# Patient Record
Sex: FEMALE | Race: BLACK OR AFRICAN AMERICAN | Employment: UNEMPLOYED | URBAN - METROPOLITAN AREA
[De-identification: names, ages, dates, MRNs, and addresses within clinical notes are randomized per-mention and may not be internally consistent; named-entity substitution may affect disease eponyms.]

---

## 2017-05-30 ENCOUNTER — ROUTINE PRENATAL (OUTPATIENT)
Dept: OBGYN CLINIC | Age: 32
End: 2017-05-30

## 2017-05-30 ENCOUNTER — HOSPITAL ENCOUNTER (OUTPATIENT)
Dept: LAB | Age: 32
Discharge: HOME | End: 2017-05-30
Payer: OTHER GOVERNMENT

## 2017-05-30 VITALS
HEART RATE: 93 BPM | DIASTOLIC BLOOD PRESSURE: 71 MMHG | WEIGHT: 250 LBS | SYSTOLIC BLOOD PRESSURE: 126 MMHG | BODY MASS INDEX: 40.18 KG/M2 | HEIGHT: 66 IN

## 2017-05-30 DIAGNOSIS — N91.2 AMENORRHEA: Primary | ICD-10-CM

## 2017-05-30 DIAGNOSIS — E66.01 MORBID OBESITY DUE TO EXCESS CALORIES (HCC): ICD-10-CM

## 2017-05-30 DIAGNOSIS — Z3A.01 7 WEEKS GESTATION OF PREGNANCY: ICD-10-CM

## 2017-05-30 LAB — GLUCOSE 1H P 100 G GLC PO SERPL-MCNC: 121 MG/DL

## 2017-05-30 PROCEDURE — 36415 COLL VENOUS BLD VENIPUNCTURE: CPT | Performed by: OBSTETRICS & GYNECOLOGY

## 2017-05-30 PROCEDURE — 82950 GLUCOSE TEST: CPT | Performed by: OBSTETRICS & GYNECOLOGY

## 2017-05-30 NOTE — MR AVS SNAPSHOT
Visit Information Date & Time Provider Department Dept. Phone Encounter #  
 5/30/2017  2:15 PM Valerie Valdez, [address] [facility] [phone] [id number] Upcoming Health Maintenance Date Due INFLUENZA AGE 9 TO ADULT 8/1/2017 PAP AKA CERVICAL CYTOLOGY 9/15/2018 Allergies as of 5/30/2017  Review Complete On: 5/30/2017 By: Valerie Valdez, DO No Known Allergies Current Immunizations  Reviewed on 8/15/2016 No immunizations on file. Not reviewed this visit You Were Diagnosed With   
  
 Codes Comments Amenorrhea    -  Primary ICD-10-CM: N91.2 ICD-9-CM: 626.0   
 7 weeks gestation of pregnancy     ICD-10-CM: Z3A.01 
ICD-9-CM: V22.2 Vitals BP Pulse Height(growth percentile) Weight(growth percentile) LMP BMI  
 126/71 93 5' 6\" (1.676 m) 250 lb (113.4 kg) 04/06/2017 40.35 kg/m2 OB Status Smoking Status Pregnant Never Smoker Vitals History BMI and BSA Data Body Mass Index Body Surface Area  
 40.35 kg/m 2 2.3 m 2 Preferred Pharmacy Pharmacy Name Phone [facility] [address] [facility], [address] [place] [address] [phone] Your Updated Medication List  
  
   
This list is accurate as of: 5/30/17  3:12 PM.  Always use your most recent med list.  
  
  
  
  
 Blood-Glucose Meter monitoring kit Check fasting sugars twice a day before breakfast and dinner. One touch meter  
  
 glucose blood VI test strips strip Commonly known as:  ASCENSIA AUTODISC VI, ONE TOUCH ULTRA TEST VI Check fasting sugars twice a day before breakfast and dinner. One touch meter Lancets Misc Check fasting sugars twice a day before breakfast and dinner. One touch meter  
  
 multivitamin tablet Commonly known as:  ONE A DAY Take  by mouth. Take one po occasionally Patient Instructions Weeks 6 to 10 of Your Pregnancy: Care Instructions Your Care Instructions Congratulations on your pregnancy. This is an exciting and important time for you. During the first 6 to 10 weeks of your pregnancy, your body goes through many changes. Your baby grows very fast, even though you cannot feel it yet. You may start to notice that you feel different, both in your body and your emotions. Because each woman's pregnancy is unique, there is no right way to feel. You may feel the healthiest you have ever been, or you may feel tired or sick to your stomach (\"morning sickness\"). These early weeks are a time to make healthy choices and to eat the best foods for you and your baby. This care sheet will give you some ideas. This is also a good time to think about birth defects testing. These are tests done during pregnancy to look for possible problems with the baby. First trimester tests for birth defects can be done between 8 and 17 weeks of pregnancy, depending on the test. Talk with your doctor about what kinds of tests are available. Follow-up care is a key part of your treatment and safety. Be sure to make and go to all appointments, and call your doctor if you are having problems. It's also a good idea to know your test results and keep a list of the medicines you take. How can you care for yourself at home? Eat well · Eat at least 3 meals and 2 healthy snacks every day. Eat fresh, whole foods, including: ¨ 7 or more servings of bread, tortillas, cereal, rice, pasta, or oatmeal. 
¨ 3 or more servings of vegetables, especially leafy green vegetables. ¨ 2 or more servings of fruits. ¨ 3 or more servings of milk, yogurt, or cheese. ¨ 2 or more servings of meat, turkey, chicken, fish, eggs, or dried beans. · Drink plenty of fluids, especially water. Avoid sodas and other sweetened drinks. · Choose foods that have important vitamins for your baby, such as calcium, iron, and folate.  
¨ Dairy products, tofu, canned fish with bones, almonds, broccoli, dark leafy greens, corn tortillas, and fortified orange juice are good sources of calcium. ¨ Beef, poultry, liver, spinach, lentils, dried beans, fortified cereals, and dried fruits are rich in iron. ¨ Dark leafy greens, broccoli, asparagus, liver, fortified cereals, orange juice, peanuts, and almonds are good sources of folate. · Avoid foods that could harm your baby. ¨ Do not eat raw or undercooked meat, chicken, or fish (such as sushi or raw oysters). ¨ Do not eat raw eggs or foods that contain raw eggs, such as Caesar dressing. ¨ Do not eat soft cheeses and unpasteurized dairy foods, such as Brie, feta, or blue cheese. ¨ Do not eat fish that contains a lot of mercury, such as shark, swordfish, tilefish, or nhi mackerel. Do not eat more than 6 ounces of tuna each week. ¨ Do not eat raw sprouts, especially alfalfa sprouts. ¨ Cut down on caffeine, such as coffee, tea, and cola. Protect yourself and your baby · Do not touch ha litter or cat feces. They can cause an infection that could harm your baby. · High body temperature can be harmful to your baby. So if you want to use a sauna or hot tub, be sure to talk to your doctor about how to use it safely. [place] with morning sickness · Sip small amounts of water, juices, or shakes. Try drinking between meals, not with meals. · Eat 5 or 6 small meals a day. Try dry toast or crackers when you first get up, and eat breakfast a little later. · Avoid spicy, greasy, and fatty foods. · When you feel sick, open your windows or go for a short walk to get fresh air. · Try nausea wristbands. These help some women. · Tell your doctor if you think your prenatal vitamins make you sick. Where can you learn more? Go to http://jesica.info/. Enter G112 in the search box to learn more about \"Weeks 6 to 10 of Your Pregnancy: Care Instructions. \" Current as of: May 30, 2016 Content Version: 11.2 © 5151-5551 Healthwise, Incorporated. Care instructions adapted under license by [employer] (which disclaims liability or warranty for this information). If you have questions about a medical condition or this instruction, always ask your healthcare professional. [address] any warranty or liability for your use of this information. Introducing [facility] & HEALTH SERVICES! [employer] introduces Jaunt patient portal. Now you can access parts of your medical record, email your doctor's office, and request medication refills online. 1. In your internet browser, go to [email or web address] 2. Click on the First Time User? Click Here link in the Sign In box. You will see the New Member Sign Up page. 3. Enter your Jaunt Access Code exactly as it appears below. You will not need to use this code after youve completed the sign-up process. If you do not sign up before the expiration date, you must request a new code. · Jaunt Access Code: [email or web address] Expires: 8/28/2017  3:12 PM 
 
4. Enter the last four digits of your Social Security Number (xxxx) and Date of Birth (mm/dd/yyyy) as indicated and click Submit. You will be taken to the next sign-up page. 5. Create a Jaunt ID. This will be your Jaunt login ID and cannot be changed, so think of one that is secure and easy to remember. 6. Create a Jaunt password. You can change your password at any time. 7. Enter your Password Reset Question and Answer. This can be used at a later time if you forget your password. 8. Enter your e-mail address. You will receive e-mail notification when new information is available in [address] 9. Click Sign Up. You can now view and download portions of your medical record. 10. Click the Download Summary menu link to download a portable copy of your medical information.  
 
If you have questions, please visit the Frequently Asked Questions section of the RegeneMed. Remember, [employer] is NOT to be used for urgent needs. For medical emergencies, dial 911. Now available from your iPhone and Android! Please provide this summary of care documentation to your next provider. Your primary care clinician is listed as Mahendra Watt. If you have any questions after today's visit, please call [phone].

## 2017-05-30 NOTE — PATIENT INSTRUCTIONS
Weeks 6 to 10 of Your Pregnancy: Care Instructions  Your Care Instructions    Congratulations on your pregnancy. This is an exciting and important time for you. During the first 6 to 10 weeks of your pregnancy, your body goes through many changes. Your baby grows very fast, even though you cannot feel it yet. You may start to notice that you feel different, both in your body and your emotions. Because each woman's pregnancy is unique, there is no right way to feel. You may feel the healthiest you have ever been, or you may feel tired or sick to your stomach (\"morning sickness\"). These early weeks are a time to make healthy choices and to eat the best foods for you and your baby. This care sheet will give you some ideas. This is also a good time to think about birth defects testing. These are tests done during pregnancy to look for possible problems with the baby. First trimester tests for birth defects can be done between 8 and 17 weeks of pregnancy, depending on the test. Talk with your doctor about what kinds of tests are available. Follow-up care is a key part of your treatment and safety. Be sure to make and go to all appointments, and call your doctor if you are having problems. It's also a good idea to know your test results and keep a list of the medicines you take. How can you care for yourself at home? Eat well  · Eat at least 3 meals and 2 healthy snacks every day. Eat fresh, whole foods, including:  ¨ 7 or more servings of bread, tortillas, cereal, rice, pasta, or oatmeal.  ¨ 3 or more servings of vegetables, especially leafy green vegetables. ¨ 2 or more servings of fruits. ¨ 3 or more servings of milk, yogurt, or cheese. ¨ 2 or more servings of meat, turkey, chicken, fish, eggs, or dried beans. · Drink plenty of fluids, especially water. Avoid sodas and other sweetened drinks. · Choose foods that have important vitamins for your baby, such as calcium, iron, and folate.   ¨ Dairy products, tofu, canned fish with bones, almonds, broccoli, dark leafy greens, corn tortillas, and fortified orange juice are good sources of calcium. ¨ Beef, poultry, liver, spinach, lentils, dried beans, fortified cereals, and dried fruits are rich in iron. ¨ Dark leafy greens, broccoli, asparagus, liver, fortified cereals, orange juice, peanuts, and almonds are good sources of folate. · Avoid foods that could harm your baby. ¨ Do not eat raw or undercooked meat, chicken, or fish (such as sushi or raw oysters). ¨ Do not eat raw eggs or foods that contain raw eggs, such as Caesar dressing. ¨ Do not eat soft cheeses and unpasteurized dairy foods, such as Brie, feta, or blue cheese. ¨ Do not eat fish that contains a lot of mercury, such as shark, swordfish, tilefish, or nhi mackerel. Do not eat more than 6 ounces of tuna each week. ¨ Do not eat raw sprouts, especially alfalfa sprouts. ¨ Cut down on caffeine, such as coffee, tea, and cola. Protect yourself and your baby  · Do not touch ha litter or cat feces. They can cause an infection that could harm your baby. · High body temperature can be harmful to your baby. So if you want to use a sauna or hot tub, be sure to talk to your doctor about how to use it safely. [place] with morning sickness  · Sip small amounts of water, juices, or shakes. Try drinking between meals, not with meals. · Eat 5 or 6 small meals a day. Try dry toast or crackers when you first get up, and eat breakfast a little later. · Avoid spicy, greasy, and fatty foods. · When you feel sick, open your windows or go for a short walk to get fresh air. · Try nausea wristbands. These help some women. · Tell your doctor if you think your prenatal vitamins make you sick. Where can you learn more? Go to http://jesica.info/. Enter G112 in the search box to learn more about \"Weeks 6 to 10 of Your Pregnancy: Care Instructions. \"  Current as of:  May 30, 2016  Content Version: 11.2  © 0755-7667 Free-lance[employer], Incorporated. Care instructions adapted under license by [employer] (which disclaims liability or warranty for this information). If you have questions about a medical condition or this instruction, always ask your healthcare professional. [address] any warranty or liability for your use of this information.

## 2017-05-30 NOTE — PROGRESS NOTES
Pawan Barron is a 33y.o. year old female presenting with missed menses. Patient's last menstrual period was 2017. Patient complains of breast tenderness and mild nausea but denies vaginal bleeding, abdominal pain, abnormal vaginal discharge. Past medical and surgical history, family history, medications, allergies, social reviewed. OB History      Para Term  AB TAB SAB Ectopic Multiple Living    1 0 0 0 0 0 0 0 0 0          Gyn ROS: normal menses, no abnormal bleeding, pelvic pain or discharge, no breast pain or new or enlarging lumps on self exam    Vitals:    17 1411   BP: 126/71   Pulse: 93   Weight: 250 lb (113.4 kg)   Height: 5' 6\" (1.676 m)       Gen:  No acute distress, pleasant  Abdomen:  Soft, Nontender, non-distended, +bowel sounds  Ext:  No clubbing, cyanosis, edema  Transvaginal ultrasound performed:    Summary  SLIUP at 7 weeks 3 days, fetal heart rate noted. Patient's last menstrual period was 2017. consistent with today's ultrasound. DANA by [address] 2018   Recommended DANA:  2018      Assessment and Plan:  1. Missed menses--pregnancy confirmed today. Safe practices and medications during pregnancy discussed with patient. All questions were answered. Patient to start or continue taking prenatal vitamins. Pt to return to office for first OB visit. 2.  Type 2 DM per chart review, but patient states she is not a diabetic, or was not made aware that she was. Last HgA1c was 5.9 per patient. Early GCT today. LPN and patient's MIL was present in the room during this entire encounter. I have verbalized the plan of care with patient. The patient was given a full opportunity to ask questions, indicated that her questions had been answered and expressed understanding. Greater than 50% of the this 45 min visit was spent in counseling and/or coordination of care.

## 2017-06-26 ENCOUNTER — TELEPHONE (OUTPATIENT)
Dept: OBGYN CLINIC | Age: 32
End: 2017-06-26

## 2017-06-26 NOTE — TELEPHONE ENCOUNTER
Spoke this patient concerning   Miscarriage ,patient bleeding seen at ED in [place] , doctor did not see cardiac activity  patient states she did pass a possible sac  , appointment on Thursday Morning

## 2017-06-26 NOTE — TELEPHONE ENCOUNTER
Patient state she continues to cramp and to have vaginal bleeding , discussed bleeding precuations and if she symtom  become  Worst return to ED

## 2017-06-26 NOTE — TELEPHONE ENCOUNTER
Patient called because she went to ED on Saturday, may be having a miscarriage. She will not be back in town for her appointment tomorrow 6/27/17 and would like a call with medical advice at [phone].

## 2017-06-29 ENCOUNTER — ROUTINE PRENATAL (OUTPATIENT)
Dept: OBGYN CLINIC | Age: 32
End: 2017-06-29

## 2017-06-29 ENCOUNTER — TELEPHONE (OUTPATIENT)
Dept: OBGYN CLINIC | Age: 32
End: 2017-06-29

## 2017-06-29 ENCOUNTER — HOSPITAL ENCOUNTER (OUTPATIENT)
Dept: LAB | Age: 32
Discharge: HOME | End: 2017-06-29
Payer: OTHER GOVERNMENT

## 2017-06-29 VITALS
DIASTOLIC BLOOD PRESSURE: 78 MMHG | WEIGHT: 250 LBS | SYSTOLIC BLOOD PRESSURE: 123 MMHG | HEART RATE: 84 BPM | BODY MASS INDEX: 40.18 KG/M2 | HEIGHT: 66 IN

## 2017-06-29 DIAGNOSIS — O03.9 SAB (SPONTANEOUS ABORTION): Primary | ICD-10-CM

## 2017-06-29 DIAGNOSIS — O03.9 SAB (SPONTANEOUS ABORTION): ICD-10-CM

## 2017-06-29 PROBLEM — D25.1 INTRAMURAL LEIOMYOMA OF UTERUS: Status: ACTIVE | Noted: 2017-06-29

## 2017-06-29 LAB
ABO + RH BLD: NORMAL
BASOPHILS # BLD AUTO: 0 K/UL (ref 0–0.06)
BASOPHILS # BLD: 0 % (ref 0–2)
BLOOD GROUP ANTIBODIES SERPL: NORMAL
DIFFERENTIAL METHOD BLD: NORMAL
EOSINOPHIL # BLD: 0.2 K/UL (ref 0–0.4)
EOSINOPHIL NFR BLD: 2 % (ref 0–5)
ERYTHROCYTE [DISTWIDTH] IN BLOOD BY AUTOMATED COUNT: 14.1 % (ref 11.6–14.5)
HCG SERPL-ACNC: 155 MIU/ML (ref 0–10)
HCT VFR BLD AUTO: 37.5 % (ref 35–45)
HGB BLD-MCNC: 12.9 G/DL (ref 12–16)
LYMPHOCYTES # BLD AUTO: 32 % (ref 21–52)
LYMPHOCYTES # BLD: 2.4 K/UL (ref 0.9–3.6)
MCH RBC QN AUTO: 30.6 PG (ref 24–34)
MCHC RBC AUTO-ENTMCNC: 34.4 G/DL (ref 31–37)
MCV RBC AUTO: 89.1 FL (ref 74–97)
MONOCYTES # BLD: 0.4 K/UL (ref 0.05–1.2)
MONOCYTES NFR BLD AUTO: 5 % (ref 3–10)
NEUTS SEG # BLD: 4.6 K/UL (ref 1.8–8)
NEUTS SEG NFR BLD AUTO: 61 % (ref 40–73)
PLATELET # BLD AUTO: 284 K/UL (ref 135–420)
PMV BLD AUTO: 11.4 FL (ref 9.2–11.8)
RBC # BLD AUTO: 4.21 M/UL (ref 4.2–5.3)
SPECIMEN EXP DATE BLD: NORMAL
WBC # BLD AUTO: 7.4 K/UL (ref 4.6–13.2)

## 2017-06-29 PROCEDURE — 36415 COLL VENOUS BLD VENIPUNCTURE: CPT | Performed by: OBSTETRICS & GYNECOLOGY

## 2017-06-29 PROCEDURE — 86900 BLOOD TYPING SEROLOGIC ABO: CPT | Performed by: OBSTETRICS & GYNECOLOGY

## 2017-06-29 PROCEDURE — 84702 CHORIONIC GONADOTROPIN TEST: CPT | Performed by: OBSTETRICS & GYNECOLOGY

## 2017-06-29 PROCEDURE — 85025 COMPLETE CBC W/AUTO DIFF WBC: CPT | Performed by: OBSTETRICS & GYNECOLOGY

## 2017-07-06 ENCOUNTER — OFFICE VISIT (OUTPATIENT)
Dept: OBGYN CLINIC | Age: 32
End: 2017-07-06

## 2017-07-06 ENCOUNTER — HOSPITAL ENCOUNTER (OUTPATIENT)
Dept: LAB | Age: 32
Discharge: HOME | End: 2017-07-06
Payer: OTHER GOVERNMENT

## 2017-07-06 DIAGNOSIS — O20.0 THREATENED MISCARRIAGE IN EARLY PREGNANCY: ICD-10-CM

## 2017-07-06 DIAGNOSIS — O20.0 THREATENED MISCARRIAGE IN EARLY PREGNANCY: Primary | ICD-10-CM

## 2017-07-06 LAB — HCG SERPL-ACNC: 19 MIU/ML (ref 0–10)

## 2017-07-06 PROCEDURE — 84702 CHORIONIC GONADOTROPIN TEST: CPT | Performed by: OBSTETRICS & GYNECOLOGY

## 2017-07-06 PROCEDURE — 36415 COLL VENOUS BLD VENIPUNCTURE: CPT | Performed by: OBSTETRICS & GYNECOLOGY

## 2017-07-13 ENCOUNTER — ROUTINE PRENATAL (OUTPATIENT)
Dept: OBGYN CLINIC | Age: 32
End: 2017-07-13

## 2017-07-13 ENCOUNTER — HOSPITAL ENCOUNTER (OUTPATIENT)
Dept: LAB | Age: 32
Discharge: HOME | End: 2017-07-13
Payer: OTHER GOVERNMENT

## 2017-07-13 VITALS
HEART RATE: 89 BPM | RESPIRATION RATE: 18 BRPM | WEIGHT: 253 LBS | BODY MASS INDEX: 40.66 KG/M2 | DIASTOLIC BLOOD PRESSURE: 73 MMHG | HEIGHT: 66 IN | SYSTOLIC BLOOD PRESSURE: 118 MMHG

## 2017-07-13 DIAGNOSIS — E11.9 DIABETES MELLITUS, STABLE (HCC): ICD-10-CM

## 2017-07-13 DIAGNOSIS — O03.9 SAB (SPONTANEOUS ABORTION): Primary | ICD-10-CM

## 2017-07-13 DIAGNOSIS — O03.9 SAB (SPONTANEOUS ABORTION): ICD-10-CM

## 2017-07-13 DIAGNOSIS — D25.1 INTRAMURAL LEIOMYOMA OF UTERUS: ICD-10-CM

## 2017-07-13 DIAGNOSIS — E66.01 MORBID OBESITY WITH BMI OF 40.0-44.9, ADULT (HCC): ICD-10-CM

## 2017-07-13 LAB
EST. AVERAGE GLUCOSE BLD GHB EST-MCNC: 120 MG/DL
HBA1C MFR BLD: 5.8 % (ref 4.2–5.6)
HCG SERPL-ACNC: 7 MIU/ML (ref 0–10)

## 2017-07-13 PROCEDURE — 83036 HEMOGLOBIN GLYCOSYLATED A1C: CPT | Performed by: OBSTETRICS & GYNECOLOGY

## 2017-07-13 PROCEDURE — 36415 COLL VENOUS BLD VENIPUNCTURE: CPT | Performed by: OBSTETRICS & GYNECOLOGY

## 2017-07-13 PROCEDURE — 84702 CHORIONIC GONADOTROPIN TEST: CPT | Performed by: OBSTETRICS & GYNECOLOGY

## 2017-07-13 NOTE — PROGRESS NOTES
Subjective:      Patient presents for follow up of a SAB. She was originally diagnosed while out of town in [place] and was managed with expectant management. Her last hCG was 19 on 7/6. She states that her bleeding and cramping have completely resolved. She has questions today about the timing of her next pregnancy and about fibroids which were seen on her ultrasound. Current Outpatient Prescriptions   Medication Sig Dispense Refill    glucose blood VI test strips (ASCENSIA AUTODISC VI, ONE TOUCH ULTRA TEST VI) strip Check fasting sugars twice a day before breakfast and dinner. One touch meter 100 Strip 11    Blood-Glucose Meter monitoring kit Check fasting sugars twice a day before breakfast and dinner. One touch meter 1 Kit 0    Lancets misc Check fasting sugars twice a day before breakfast and dinner. One touch meter 100 Each 11    multivitamin (ONE A DAY) tablet Take  by mouth. Take one po occasionally       No Known Allergies  Past Medical History:   Diagnosis Date    Diabetes mellitus, stable ([address] 6/2016    Dyslipidemia 6/2016    Fatty liver 7/2016    Vitamin D deficiency 6/2016     Past Surgical History:   Procedure Laterality Date    HX WISDOM TEETH EXTRACTION  at age 13 or 9yo    all 3     Family History   Problem Relation Age of Onset    Diabetes Father     Heart Disease Maternal Aunt      MI in her 52's    Heart Disease Mother     Elevated Lipids Mother     Thyroid Disease Mother      Social History   Substance Use Topics    Smoking status: Never Smoker    Smokeless tobacco: Never Used      Comment: Pt counseled to continue to not smoke.  Alcohol use 0.0 oz/week     0 Standard drinks or equivalent per week      Comment: occasionally      Review of Systems    A comprehensive review of systems was negative except for that written in the HPI. Objective:     @IPVITALS(8:6,8,9,5,10)@  : Body mass index is 40.84 kg/(m^2).   Visit Vitals    /73    Pulse 89    Resp 18    Ht 5' 6\" (1.676 m)    Wt 253 lb (114.8 kg)    LMP 04/06/2017    BMI 40.84 kg/m2     General appearance: alert, cooperative, no distress, appears stated age, morbidly obese  Exam deferred. Assessment/Plan:     Completed SAB. Discussed with the patient that the optimal spacing for her next pregnancy is at least 6 months, especially given her preexisting DM. Will draw Hgb A1C today to assess glucose control, since she has not been under a doctor's care for the past year. Recommended weight loss prior to another pregnancy. Uterine fibroids. Review of records from [place] show multiple intramural fibroids with the largest 2 measuring 11 and 5 cm at the fundus. Discussed with the patient that since these are at the fundus rather than the lower uterus they will most likely not cause problems in a pregnancy but would be monitored by US. Offered contraceptive options for interconceptional spacing, but the patient declines. Will contact her with lab results. Encouraged follow up with a PCP. Follow up prn. Plan of care discussed. Patient expressed understanding. The entirety of the 15 minute visit was spent in counseling and coordination of care.

## 2017-07-14 NOTE — PROGRESS NOTES
Please call the patient and tell her that her hCG is negative for pregnancy and her HgbA1C is slightly high at 5.8 (normal < 5.6).

## 2017-07-24 ENCOUNTER — OFFICE VISIT (OUTPATIENT)
Dept: INTERNAL MEDICINE CLINIC | Age: 32
End: 2017-07-24

## 2017-07-24 VITALS
WEIGHT: 254 LBS | TEMPERATURE: 97.6 F | DIASTOLIC BLOOD PRESSURE: 70 MMHG | HEART RATE: 83 BPM | OXYGEN SATURATION: 98 % | HEIGHT: 66 IN | BODY MASS INDEX: 40.82 KG/M2 | RESPIRATION RATE: 16 BRPM | SYSTOLIC BLOOD PRESSURE: 130 MMHG

## 2017-07-24 DIAGNOSIS — E78.5 DYSLIPIDEMIA: ICD-10-CM

## 2017-07-24 DIAGNOSIS — Z23 ENCOUNTER FOR IMMUNIZATION: ICD-10-CM

## 2017-07-24 DIAGNOSIS — E55.9 VITAMIN D DEFICIENCY: ICD-10-CM

## 2017-07-24 DIAGNOSIS — Z00.00 ROUTINE GENERAL MEDICAL EXAMINATION AT A HEALTH CARE FACILITY: Primary | ICD-10-CM

## 2017-07-24 DIAGNOSIS — E11.9 DIABETES MELLITUS, STABLE (HCC): ICD-10-CM

## 2017-07-24 NOTE — MR AVS SNAPSHOT
Visit Information Date & Time Provider Department Dept. Phone Encounter #  
 7/24/2017  1:00 PM Chanelle Nixon MD [facility] [phone] [id number] Follow-up Instructions Return in about 4 months (around 11/24/2017) for f/u DM. Upcoming Health Maintenance Date Due  
 FOOT EXAM Q1 11/10/1993 MICROALBUMIN Q1 11/10/1993 EYE EXAM RETINAL OR DILATED Q1 11/10/1993 LIPID PANEL Q1 6/23/2017 INFLUENZA AGE 9 TO ADULT 8/1/2017 HEMOGLOBIN A1C Q6M 1/13/2018 PAP AKA CERVICAL CYTOLOGY 9/15/2018 DTaP/Tdap/Td series (2 - Td) 7/24/2027 Allergies as of 7/24/2017  Review Complete On: 7/24/2017 By: Chanelle Nixon MD  
 No Known Allergies Current Immunizations  Reviewed on 8/15/2016 Name Date Tdap  Incomplete Not reviewed this visit You Were Diagnosed With   
  
 Codes Comments Routine general medical examination at a health care facility    -  Primary ICD-10-CM: Z00.00 ICD-9-CM: V70.0 Encounter for immunization     ICD-10-CM: P79 ICD-9-CM: V03.89 [facility]     ICD-10-CM: E11.9 ICD-9-CM: 250.00 Dyslipidemia     ICD-10-CM: E78.5 ICD-9-CM: 272.4 Vitamin D deficiency     ICD-10-CM: E55.9 ICD-9-CM: 268.9 Vitals BP Pulse Temp Resp Height(growth percentile) Weight(growth percentile) 137/80 (BP 1 Location: Left arm, BP Patient Position: Sitting) 83 97.6 °F (36.4 °C) (Oral) 16 5' 6\" (1.676 m) 254 lb (115.2 kg) LMP SpO2 Breastfeeding? BMI OB Status Smoking Status 04/06/2017 98% Unknown 41 kg/m2 Recent pregnancy Never Smoker Vitals History BMI and BSA Data Body Mass Index Body Surface Area 41 kg/m 2 2.32 m 2 Preferred Pharmacy Pharmacy Name Phone [facility] [address] [facility], [address] [place] [address] [phone] Your Updated Medication List  
  
   
 This list is accurate as of: 7/24/17  1:32 PM.  Always use your most recent med list.  
  
  
  
  
 Blood-Glucose Meter monitoring kit Check fasting sugars twice a day before breakfast and dinner. One touch meter  
  
 glucose blood VI test strips strip Commonly known as:  ASCENSIA AUTODISC VI, ONE TOUCH ULTRA TEST VI Check fasting sugars twice a day before breakfast and dinner. One touch meter Lancets Misc Check fasting sugars twice a day before breakfast and dinner. One touch meter  
  
 pnv w/o calcium-iron fum-fa 27-1 mg Tab Take  by mouth. Take one po daily. PRENATAL DHA PO Take  by mouth. We Performed the Following  DIABETES FOOT EXAM [HM7 Custom] REFERRAL TO OPHTHALMOLOGY [REF57 Custom] Comments:  
 Please evaluate patient for DM2 eye exam in 1-2 weeks In [place] who accepts  standard. TETANUS, DIPHTHERIA TOXOIDS AND ACELLULAR PERTUSSIS VACCINE (TDAP), IN INDIVIDS. >=7, IM [id number] CPT(R)] Follow-up Instructions Return in about 4 months (around 11/24/2017) for f/u DM. To-Do List   
 07/24/2017 Lab:  LIPID PANEL   
  
 08/24/2017 Lab:  CBC W/O DIFF   
  
 08/24/2017 Lab:  METABOLIC PANEL, COMPREHENSIVE   
  
 08/24/2017 Lab:  MICROALBUMIN, UR, RAND W/ MICROALBUMIN/CREA RATIO   
  
 08/24/2017 Lab:  TSH 3RD GENERATION   
  
 08/24/2017 Lab:  URINALYSIS W/ RFLX MICROSCOPIC Referral Information Referral ID Referred By Referred To  
  
 [id number] JESSIE, [address] [facility] Phone: [phone] Fax: [phone] Visits Status Start Date End Date 1 New Request 7/24/17 7/24/18 If your referral has a status of pending review or denied, additional information will be sent to support the outcome of this decision. Patient Instructions 1) follow-up in 4 months or sooner if worsening symptoms. Introducing [facility] & HEALTH SERVICES! Dear DEJAN [facility]: Thank you for requesting a Axcient account. Our records indicate that you already have an active Axcient account. You can access your account anytime at [email or web address] Did you know that you can access your hospital and ER discharge instructions at any time in Axcient? You can also review all of your test results from your hospital stay or ER visit. Additional Information If you have questions, please visit the Frequently Asked Questions section of the Axcient website at [email or web address] Remember, Axcient is NOT to be used for urgent needs. For medical emergencies, dial 911. Now available from your iPhone and Android! Please provide this summary of care documentation to your next provider. Your primary care clinician is listed as Mahendra Watt. If you have any questions after today's visit, please call [phone].

## 2017-07-24 NOTE — PROGRESS NOTES
ROOM # 1    Opal Pelayo presents today for   Chief Complaint   Patient presents with   [address] Complete Physical       Opal Pelayo preferred language for health care discussion is english/other. Is someone accompanying this pt? no    Is the patient using any DME equipment during OV? no    Depression Screening:  PHQ over the last two weeks 7/24/2017 8/15/2016 6/30/2016 6/21/2016   Little interest or pleasure in doing things Not at all Not at all Not at all Not at all   Feeling down, depressed or hopeless Not at all Not at all Not at all Not at all   Total Score PHQ 2 0 0 0 0       Learning Assessment:  Learning Assessment 6/21/2016   PRIMARY LEARNER Patient   PRIMARY LANGUAGE ENGLISH   LEARNER PREFERENCE PRIMARY DEMONSTRATION     LISTENING     READING   ANSWERED BY patient   RELATIONSHIP SELF       Abuse Screening:  No flowsheet data found. Fall Risk  No flowsheet data found. Health Maintenance reviewed and discussed per provider. Yes    Brad Doll is due for foot exam, microalbumin, eye exam, pneumonia vax, TDAP vax, lipid . Please order/place referral if appropriate. Advance Directive:  1. Do you have an advance directive in place? Patient Reply: no    2. If not, would you like material regarding how to put one in place? Patient Reply: no    Coordination of Care:  1. Have you been to the ER, urgent care clinic since your last visit? Hospitalized since your last visit? no    2. Have you seen or consulted any other health care providers outside of the [address] [place] [address] since your last visit? Include any pap smears or colon screening.  no

## 2017-07-24 NOTE — PROGRESS NOTES
Chief Complaint   Patient presents with   [facility] Establish Care    Complete Physical         HPI:     Tom Adams is a 33 y.o.  female with history of  Type 2 DM and dyslipidemia  who presents for complete physical exam. She had recent HgA1C on 7/13/17 and it was 5.8. She denies any chest pain, shortness of breath, abdominal pain, headaches or dizziness. She had a recent miscarriage. Past Medical History:   Diagnosis Date    Diabetes mellitus, stable ([address] 6/2016    Dyslipidemia 6/2016    Fatty liver 7/2016    Vitamin D deficiency 6/2016       Past Surgical History:   Procedure Laterality Date    HX WISDOM TEETH EXTRACTION  at age 13 or 11yo    all 4           MEDICATION ALLERGIES/INTOLERANCES:   No Known Allergies          CURRENT MEDICATIONS:    Current Outpatient Prescriptions   Medication Sig    DOCOSAHEXANOIC ACID (PRENATAL DHA PO) Take  by mouth.  pnv w/o calcium-iron fum-fa 27-1 mg tab Take  by mouth. Take one po daily.  glucose blood VI test strips (ASCENSIA AUTODISC VI, ONE TOUCH ULTRA TEST VI) strip Check fasting sugars twice a day before breakfast and dinner. One touch meter    Blood-Glucose Meter monitoring kit Check fasting sugars twice a day before breakfast and dinner. One touch meter    Lancets misc Check fasting sugars twice a day before breakfast and dinner. One touch meter     No current facility-administered medications for this visit.         Health Maintenance   Topic Date Due    FOOT EXAM Q1  11/10/1993    MICROALBUMIN Q1  11/10/1993    EYE EXAM RETINAL OR DILATED Q1  11/10/1993    LIPID PANEL Q1  06/23/2017    INFLUENZA AGE 9 TO ADULT  08/01/2017    HEMOGLOBIN A1C Q6M  01/13/2018    PAP AKA CERVICAL CYTOLOGY  09/15/2018    DTaP/Tdap/Td series (2 - Td) 07/24/2027    Pneumococcal 19-64 Medium Risk  Addressed         FAMILY HISTORY:   Family History   Problem Relation Age of Onset    Diabetes Father     Heart Disease Maternal Aunt      MI in her 52's    Heart Disease Mother     Elevated Lipids Mother     Thyroid Disease Mother        SOCIAL HISTORY:   She  reports that she has never smoked. She has never used smokeless tobacco.  She  reports that she drinks alcohol. HEALTH MAINTENANCE AND SCREENING:  TDAP: due    ROS:   General: negative for - chills, fatigue, fever, weight loss or weight gain, night sweats  HEENT: negative for - no sore throat, nasal congestion, vision problems or ear problems  Resp: negative for - cough, shortness of breath or wheezing  CV: negative for - chest pain, palpitations, orthopnea or PND,  GI: negative for - abdominal pain, change in bowel habits, constipation, diarrhea, blood or black tarry stools, or nausea/vomiting  : negative for - dysuria, hematuria, incontinence, pelvic pain or vulvar/vaginal symptoms  Heme: negative for -excessive bleeding or bruising  Endo: negative for - hot flashes, polydipsia/polyuria or hot or cold intolerance  MSK: negative for - joint pain, joint swelling or muscle pain  Neuro: negative for - numbness, tingling, headache or dizziness  Derm: negative for - dry skin, hair changes, rash or skin lesion changes  Psych: negative for - anxiety, depression, irritability or mood swings or insomnia    OBJECTIVE:  PHYSICAL EXAM: Vitals:   Vitals:    07/24/17 1311 07/24/17 1336   BP: 137/80 130/70   Pulse: 83    Resp: 16    Temp: 97.6 °F (36.4 °C)    TempSrc: Oral    SpO2: 98%    Weight: 254 lb (115.2 kg)    Height: 5' 6\" (1.676 m)      Generally: Pleasant female in no acute distress    HEENT exam: Head: atraumatic     Eyes: Pupils equally round and reactive to light, Fundoscopic exam is                       normal               Ears: bilaterally: Normal tympanic membrane, no erythema or exudate,                         normal light Reflex except for little ear wax in right ear. She can use debrox OTC.     Nares: moist mucosa, no erythema               Mouth: clear, no erythema or exudate Neck: supple, no lymphadenopathy, negative thyromegaly, negative                                   carotid bruits bilaterally    Cardiac exam: regular, rate, and rhythm. No murmurs, gallops, or rubs. Normal S1 and S2.    Pulmonary exam: Clear to ausculation bilaterally    Abdominal exam: Positive bowel sounds in all four quadrants, soft, nondistended, nontender. No hepatosplenomegaly. Extremities: 2+ dorsalis pedis bilaterally. No pedal edema bilaterally. Musculoskeletal exam: 5 out of 5 strength in upper and lower extremities bilaterally. Good range of motion in upper and lower extremities. 2 out of 2 reflexes in upper and lower extremities bilaterally. Neurological exam: Cranial nerves II-XII all intact. Normal sensation in upper and lower extremities. Normal gait. Bilateral breast exam: no masses felt, no TTP, no nipple discharge, bilateral axillae normal, no masses felt    Skin: No rashes, erythema, or skin lesions. LABS/RADIOLOGICAL TESTS:   Lab Results   Component Value Date/Time    WBC 7.4 06/29/2017 08:55 AM    HGB 12.9 06/29/2017 08:55 AM    HCT 37.5 06/29/2017 08:55 AM    PLATELET 069 41/24/5561 08:55 AM     Lab Results   Component Value Date/Time    Sodium 139 06/23/2016 08:08 AM    Potassium 4.1 06/23/2016 08:08 AM    Chloride 106 06/23/2016 08:08 AM    CO2 23 06/23/2016 08:08 AM    Glucose 107 06/23/2016 08:08 AM    BUN 10 06/23/2016 08:08 AM    Creatinine 0.96 06/23/2016 08:08 AM     Lab Results   Component Value Date/Time    Cholesterol, total 219 06/23/2016 08:08 AM    HDL Cholesterol 47 06/23/2016 08:08 AM    LDL, calculated 141.2 06/23/2016 08:08 AM    Triglyceride 154 06/23/2016 08:08 AM     No results found for: GPT    All lab results and radiological studies were discussed and reviewed with the patient. ASSESSMENT/PLAN:      ICD-10-CM ICD-9-CM    1.  Routine general medical examination at a health care facility Z00.00 V70.0 LIPID PANEL      CBC W/O DIFF METABOLIC PANEL, COMPREHENSIVE      URINALYSIS W/ RFLX MICROSCOPIC   2. Diabetes mellitus, stable ([facility] [address] E11.9 250.00 We will see what the labs show. Continue diet, exercise and   HM DIABETES FOOT EXAM      MICROALBUMIN, UR, RAND W/ MICROALBUMIN/CREA RATIO      REFERRAL TO OPHTHALMOLOGY      TSH 3RD GENERATION   3. Dyslipidemia E78.5 272.4 We will see what the labs show. Heddie Alzada LIPID PANEL   4. Vitamin D deficiency E55.9 268.9 Will check vitamin D.    5. Encounter for immunization Z23 V03.89 TETANUS, DIPHTHERIA TOXOIDS AND ACELLULAR PERTUSSIS VACCINE (TDAP), IN INDIVIDS. >=7, IM     6. Patient verbalized understanding and agreement with the plan. 7. Patient was given after visit summary. 8.   Follow-up Disposition:  Return in about 4 months (around 11/24/2017) for f/u DM. or sooner if worsening symptoms.         Ricardo Varghese MD

## 2017-07-26 ENCOUNTER — HOSPITAL ENCOUNTER (OUTPATIENT)
Dept: LAB | Age: 32
Discharge: HOME | End: 2017-07-26
Payer: OTHER GOVERNMENT

## 2017-07-26 ENCOUNTER — LAB ONLY (OUTPATIENT)
Dept: INTERNAL MEDICINE CLINIC | Age: 32
End: 2017-07-26

## 2017-07-26 DIAGNOSIS — E78.5 DYSLIPIDEMIA: ICD-10-CM

## 2017-07-26 DIAGNOSIS — Z00.00 ROUTINE GENERAL MEDICAL EXAMINATION AT A HEALTH CARE FACILITY: ICD-10-CM

## 2017-07-26 DIAGNOSIS — E11.9 DIABETES MELLITUS, STABLE (HCC): ICD-10-CM

## 2017-07-26 DIAGNOSIS — E55.9 VITAMIN D DEFICIENCY: ICD-10-CM

## 2017-07-26 LAB
25(OH)D3 SERPL-MCNC: 19.5 NG/ML (ref 30–100)
ALBUMIN SERPL BCP-MCNC: 4 G/DL (ref 3.4–5)
ALBUMIN/GLOB SERPL: 1.1 {RATIO} (ref 0.8–1.7)
ALP SERPL-CCNC: 78 U/L (ref 45–117)
ALT SERPL-CCNC: 33 U/L (ref 13–56)
ANION GAP BLD CALC-SCNC: 8 MMOL/L (ref 3–18)
APPEARANCE UR: CLEAR
AST SERPL W P-5'-P-CCNC: 18 U/L (ref 15–37)
BILIRUB SERPL-MCNC: 0.5 MG/DL (ref 0.2–1)
BILIRUB UR QL: NEGATIVE
BUN SERPL-MCNC: 10 MG/DL (ref 7–18)
BUN/CREAT SERPL: 9 (ref 12–20)
CALCIUM SERPL-MCNC: 8.6 MG/DL (ref 8.5–10.1)
CHLORIDE SERPL-SCNC: 106 MMOL/L (ref 100–108)
CHOLEST SERPL-MCNC: 206 MG/DL
CO2 SERPL-SCNC: 26 MMOL/L (ref 21–32)
COLOR UR: YELLOW
CREAT SERPL-MCNC: 1.12 MG/DL (ref 0.6–1.3)
ERYTHROCYTE [DISTWIDTH] IN BLOOD BY AUTOMATED COUNT: 14.3 % (ref 11.6–14.5)
GLOBULIN SER CALC-MCNC: 3.5 G/DL (ref 2–4)
GLUCOSE SERPL-MCNC: 91 MG/DL (ref 74–99)
GLUCOSE UR STRIP.AUTO-MCNC: NEGATIVE MG/DL
HCT VFR BLD AUTO: 40.3 % (ref 35–45)
HDLC SERPL-MCNC: 49 MG/DL (ref 40–60)
HDLC SERPL: 4.2 {RATIO} (ref 0–5)
HGB BLD-MCNC: 13.2 G/DL (ref 12–16)
HGB UR QL STRIP: NEGATIVE
KETONES UR QL STRIP.AUTO: NEGATIVE MG/DL
LDLC SERPL CALC-MCNC: 125 MG/DL (ref 0–100)
LEUKOCYTE ESTERASE UR QL STRIP.AUTO: NEGATIVE
LIPID PROFILE,FLP: ABNORMAL
MCH RBC QN AUTO: 29.7 PG (ref 24–34)
MCHC RBC AUTO-ENTMCNC: 32.8 G/DL (ref 31–37)
MCV RBC AUTO: 90.8 FL (ref 74–97)
NITRITE UR QL STRIP.AUTO: NEGATIVE
PH UR STRIP: 5 [PH] (ref 5–8)
PLATELET # BLD AUTO: 276 K/UL (ref 135–420)
PMV BLD AUTO: 11.4 FL (ref 9.2–11.8)
POTASSIUM SERPL-SCNC: 4.2 MMOL/L (ref 3.5–5.5)
PROT SERPL-MCNC: 7.5 G/DL (ref 6.4–8.2)
PROT UR STRIP-MCNC: NEGATIVE MG/DL
RBC # BLD AUTO: 4.44 M/UL (ref 4.2–5.3)
SODIUM SERPL-SCNC: 140 MMOL/L (ref 136–145)
SP GR UR REFRACTOMETRY: 1.02 (ref 1–1.03)
TRIGL SERPL-MCNC: 160 MG/DL (ref ?–150)
TSH SERPL DL<=0.05 MIU/L-ACNC: 2.22 UIU/ML (ref 0.36–3.74)
UROBILINOGEN UR QL STRIP.AUTO: 0.2 EU/DL (ref 0.2–1)
VLDLC SERPL CALC-MCNC: 32 MG/DL
WBC # BLD AUTO: 7.3 K/UL (ref 4.6–13.2)

## 2017-07-26 PROCEDURE — 80053 COMPREHEN METABOLIC PANEL: CPT | Performed by: INTERNAL MEDICINE

## 2017-07-26 PROCEDURE — 82043 UR ALBUMIN QUANTITATIVE: CPT | Performed by: INTERNAL MEDICINE

## 2017-07-26 PROCEDURE — 84443 ASSAY THYROID STIM HORMONE: CPT | Performed by: INTERNAL MEDICINE

## 2017-07-26 PROCEDURE — 80061 LIPID PANEL: CPT | Performed by: INTERNAL MEDICINE

## 2017-07-26 PROCEDURE — 85027 COMPLETE CBC AUTOMATED: CPT | Performed by: INTERNAL MEDICINE

## 2017-07-26 PROCEDURE — 82306 VITAMIN D 25 HYDROXY: CPT | Performed by: INTERNAL MEDICINE

## 2017-07-26 PROCEDURE — 81003 URINALYSIS AUTO W/O SCOPE: CPT | Performed by: INTERNAL MEDICINE

## 2017-07-27 ENCOUNTER — TELEPHONE (OUTPATIENT)
Dept: INTERNAL MEDICINE CLINIC | Age: 32
End: 2017-07-27

## 2017-07-27 DIAGNOSIS — E55.9 VITAMIN D DEFICIENCY: Primary | ICD-10-CM

## 2017-07-27 LAB
CREAT UR-MCNC: 247.6 MG/DL (ref 30–125)
MICROALBUMIN UR-MCNC: 0.7 MG/DL (ref 0–3)
MICROALBUMIN/CREAT UR-RTO: 3 MG/G (ref 0–30)

## 2017-07-27 RX ORDER — ERGOCALCIFEROL 1.25 MG/1
CAPSULE ORAL
Qty: 4 CAP | Refills: 0 | Status: SHIPPED | OUTPATIENT
Start: 2017-07-27 | End: 2017-10-17 | Stop reason: ALTCHOICE

## 2017-07-27 NOTE — TELEPHONE ENCOUNTER
----- Message from Renato Rashid MD sent at 7/27/2017  8:01 AM EDT -----  Please let pt know that labs were normal except:    1) vitamin D is low at 19.5. Will send electronically vitamin D2 50,000 international units  Take one po weekly x 4 weeks #4 no refills. She will need to have her vitamin D level checked after 4 weeks. Order in [facility]. 2) lipids are up. She needs to work on diet and exercise. Mail low chol diet information to pt. She needs to start fish oil 1000mg (DHA+EPA=1000mg) 2 po daily. If still up in 3 months, we will need to start her on cholesterol medication.

## 2017-07-27 NOTE — PROGRESS NOTES
Please let pt know that labs were normal except:    1) vitamin D is low at 19.5. Will send electronically vitamin D2 50,000 international units  Take one po weekly x 4 weeks #4 no refills. She will need to have her vitamin D level checked after 4 weeks. Order in [facility]. 2) lipids are up. She needs to work on diet and exercise. Mail low chol diet information to pt. She needs to start fish oil 1000mg (DHA+EPA=1000mg) 2 po daily. If still up in 3 months, we will need to start her on cholesterol medication.

## 2017-07-27 NOTE — TELEPHONE ENCOUNTER
Spoke with patient using 2 identifiers. Advised of labs and prescription sent to pharmacy. Patient verbalized understanding on all instructions.

## 2017-07-27 NOTE — TELEPHONE ENCOUNTER
Unsuccessful attempt to reach pt for results. Left message for her to call back at her earliest convenience.

## 2017-10-17 ENCOUNTER — OFFICE VISIT (OUTPATIENT)
Dept: INTERNAL MEDICINE CLINIC | Age: 32
End: 2017-10-17

## 2017-10-17 VITALS
HEIGHT: 66 IN | DIASTOLIC BLOOD PRESSURE: 85 MMHG | WEIGHT: 254.4 LBS | OXYGEN SATURATION: 100 % | BODY MASS INDEX: 40.88 KG/M2 | HEART RATE: 88 BPM | TEMPERATURE: 98.1 F | RESPIRATION RATE: 18 BRPM | SYSTOLIC BLOOD PRESSURE: 138 MMHG

## 2017-10-17 DIAGNOSIS — M25.562 ACUTE PAIN OF LEFT KNEE: Primary | ICD-10-CM

## 2017-10-17 NOTE — MR AVS SNAPSHOT
Visit Information Date & Time Provider Department Dept. Phone Encounter #  
 10/17/2017  9:00 AM MD [address] [phone] [id number] Follow-up Instructions Return if symptoms worsen or fail to improve. Your Appointments 11/28/2017  8:00 AM  
Office Visit with MD [address] [place] [address]) Appt Note: 4 month f/u DM  
 [address]  
  
   
 [address] [facility] Upcoming Health Maintenance Date Due  
 EYE EXAM RETINAL OR DILATED Q1 11/10/1993 HEMOGLOBIN A1C Q6M 1/13/2018 FOOT EXAM Q1 7/24/2018 MICROALBUMIN Q1 7/26/2018 LIPID PANEL Q1 7/26/2018 PAP AKA CERVICAL CYTOLOGY 9/15/2018 DTaP/Tdap/Td series (2 - Td) 7/24/2027 Allergies as of 10/17/2017  Review Complete On: 10/17/2017 By: Germain Hobbs MD  
 No Known Allergies Current Immunizations  Reviewed on 7/24/2017 Name Date Tdap 7/24/2017 Not reviewed this visit You Were Diagnosed With   
  
 Codes Comments Acute pain of left knee    -  Primary ICD-10-CM: B74.061 ICD-9-CM: 719.46 Vitals BP Pulse Temp Resp Height(growth percentile) Weight(growth percentile) 138/85 (BP 1 Location: Left arm, BP Patient Position: Sitting) 88 98.1 °F (36.7 °C) (Oral) 18 5' 6\" (1.676 m) 254 lb 6.4 oz (115.4 kg) LMP SpO2 BMI OB Status Smoking Status 04/06/2017 100% 41.06 kg/m2 Recent pregnancy Never Smoker Vitals History BMI and BSA Data Body Mass Index Body Surface Area 41.06 kg/m 2 2.32 m 2 Preferred Pharmacy Pharmacy Name Phone [facility] [address] [facility], [address] [place] [address] [phone] Your Updated Medication List  
  
   
This list is accurate as of: 10/17/17  9:46 AM.  Always use your most recent med list.  
  
  
  
  
 Blood-Glucose Meter monitoring kit Check fasting sugars twice a day before breakfast and dinner. One touch meter  
  
 fish oil-omega-3 fatty acids 340-1,000 mg capsule Take 1 Cap by mouth daily. glucose blood VI test strips strip Commonly known as:  ASCENSIA AUTODISC VI, ONE TOUCH ULTRA TEST VI Check fasting sugars twice a day before breakfast and dinner. One touch meter Lancets Misc Check fasting sugars twice a day before breakfast and dinner. One touch meter PRENATAL DHA PO Take  by mouth. Follow-up Instructions Return if symptoms worsen or fail to improve. To-Do List   
 10/17/2017 Imaging:  XR KNEE LT MIN 4 V   
  
 10/19/2017 Imaging:  MRI KNEE LT WO CONT Referral Information Referral ID Referred By Referred To  
  
 [id number] Devonte CASTELLON Not Available Visits Status Start Date End Date 1 New Request 10/17/17 10/17/18 If your referral has a status of pending review or denied, additional information will be sent to support the outcome of this decision. Patient Instructions 1) rest, ice, elevate 2) follow-up as needed or sooner if worsening symptoms. 3) use knee brace Knee Pain or Injury: Care Instructions Your Care Instructions Injuries are a common cause of knee problems. Sudden (acute) injuries may be caused by a direct blow to the knee. They can also be caused by abnormal twisting, bending, or falling on the knee. Pain, bruising, or swelling may be severe, and may start within minutes of the injury. Overuse is another cause of knee pain. Other causes are climbing stairs, kneeling, and other activities that use the knee. Everyday wear and tear, especially as you get older, also can cause knee pain. Rest, along with home treatment, often relieves pain and allows your knee to heal. If you have a serious knee injury, you may need tests and treatment. Follow-up care is a key part of your treatment and safety. Be sure to make and go to all appointments, and call your doctor if you are having problems. It's also a good idea to know your test results and keep a list of the medicines you take. How can you care for yourself at home? · Be safe with medicines. Read and follow all instructions on the label. ¨ If the doctor gave you a prescription medicine for pain, take it as prescribed. ¨ If you are not taking a prescription pain medicine, ask your doctor if you can take an over-the-counter medicine. · Rest and protect your knee. Take a break from any activity that may cause pain. · Put ice or a cold pack on your knee for 10 to 20 minutes at a time. Put a thin cloth between the ice and your skin. · Prop up a sore knee on a pillow when you ice it or anytime you sit or lie down for the next 3 days. Try to keep it above the level of your heart. This will help reduce swelling. · If your knee is not swollen, you can put moist heat, a heating pad, or a warm cloth on your knee. · If your doctor recommends an elastic bandage, sleeve, or other type of support for your knee, wear it as directed. · Follow your doctor's instructions about how much weight you can put on your leg. Use a cane, crutches, or a walker as instructed. · Follow your doctor's instructions about activity during your healing process. If you can do mild exercise, slowly increase your activity. · Reach and stay at a healthy weight. Extra weight can strain the joints, especially the knees and hips, and make the pain worse. Losing even a few pounds may help. When should you call for help? Call 911 anytime you think you may need emergency care. For example, call if: 
· You have symptoms of a blood clot in your lung (called a pulmonary embolism). These may include: 
¨ Sudden chest pain. ¨ Trouble breathing. ¨ Coughing up blood. Call your doctor now or seek immediate medical care if: · You have severe or increasing pain. · Your leg or foot turns cold or changes color. · You cannot stand or put weight on your knee. · Your knee looks twisted or bent out of shape. · You cannot move your knee. · You have signs of infection, such as: 
¨ Increased pain, swelling, warmth, or redness. ¨ Red streaks leading from the knee. ¨ Pus draining from a place on your knee. ¨ A fever. · You have signs of a blood clot in your leg (called a deep vein thrombosis), such as: 
¨ Pain in your calf, back of the knee, thigh, or groin. ¨ Redness and swelling in your leg or groin. Watch closely for changes in your health, and be sure to contact your doctor if: 
· You have tingling, weakness, or numbness in your knee. · You have any new symptoms, such as swelling. · You have bruises from a knee injury that last longer than 2 weeks. · You do not get better as expected. Where can you learn more? Go to http://david-nancy.info/. Enter K195 in the search box to learn more about \"Knee Pain or Injury: Care Instructions. \" Current as of: March 20, 2017 Content Version: 11.3 © 6518-5499 [employer]. Care instructions adapted under license by [employer] (which disclaims liability or warranty for this information). If you have questions about a medical condition or this instruction, always ask your healthcare professional. [place] [address] any warranty or liability for your use of this information. Introducing [facility] & HEALTH SERVICES! Dear Claude Vera: Thank you for requesting a Q-Bot account. Our records indicate that you already have an active Q-Bot account. You can access your account anytime at [email or web address] Did you know that you can access your hospital and ER discharge instructions at any time in Q-Bot? You can also review all of your test results from your hospital stay or ER visit. Additional Information If you have questions, please visit the Frequently Asked Questions section of the [employer] website at [email or web address] Remember, [employer] is NOT to be used for urgent needs. For medical emergencies, dial 911. Now available from your iPhone and Android! Please provide this summary of care documentation to your next provider. Your primary care clinician is listed as Mahendra Watt. If you have any questions after today's visit, please call [phone].

## 2017-10-17 NOTE — PATIENT INSTRUCTIONS
1) rest, ice, elevate    2) follow-up as needed or sooner if worsening symptoms. 3) use knee brace           Knee Pain or Injury: Care Instructions  Your Care Instructions    Injuries are a common cause of knee problems. Sudden (acute) injuries may be caused by a direct blow to the knee. They can also be caused by abnormal twisting, bending, or falling on the knee. Pain, bruising, or swelling may be severe, and may start within minutes of the injury. Overuse is another cause of knee pain. Other causes are climbing stairs, kneeling, and other activities that use the knee. Everyday wear and tear, especially as you get older, also can cause knee pain. Rest, along with home treatment, often relieves pain and allows your knee to heal. If you have a serious knee injury, you may need tests and treatment. Follow-up care is a key part of your treatment and safety. Be sure to make and go to all appointments, and call your doctor if you are having problems. It's also a good idea to know your test results and keep a list of the medicines you take. How can you care for yourself at home? · Be safe with medicines. Read and follow all instructions on the label. ¨ If the doctor gave you a prescription medicine for pain, take it as prescribed. ¨ If you are not taking a prescription pain medicine, ask your doctor if you can take an over-the-counter medicine. · Rest and protect your knee. Take a break from any activity that may cause pain. · Put ice or a cold pack on your knee for 10 to 20 minutes at a time. Put a thin cloth between the ice and your skin. · Prop up a sore knee on a pillow when you ice it or anytime you sit or lie down for the next 3 days. Try to keep it above the level of your heart. This will help reduce swelling. · If your knee is not swollen, you can put moist heat, a heating pad, or a warm cloth on your knee.   · If your doctor recommends an elastic bandage, sleeve, or other type of support for your knee, wear it as directed. · Follow your doctor's instructions about how much weight you can put on your leg. Use a cane, crutches, or a walker as instructed. · Follow your doctor's instructions about activity during your healing process. If you can do mild exercise, slowly increase your activity. · Reach and stay at a healthy weight. Extra weight can strain the joints, especially the knees and hips, and make the pain worse. Losing even a few pounds may help. When should you call for help? Call 911 anytime you think you may need emergency care. For example, call if:  · You have symptoms of a blood clot in your lung (called a pulmonary embolism). These may include:  ¨ Sudden chest pain. ¨ Trouble breathing. ¨ Coughing up blood. Call your doctor now or seek immediate medical care if:  · You have severe or increasing pain. · Your leg or foot turns cold or changes color. · You cannot stand or put weight on your knee. · Your knee looks twisted or bent out of shape. · You cannot move your knee. · You have signs of infection, such as:  ¨ Increased pain, swelling, warmth, or redness. ¨ Red streaks leading from the knee. ¨ Pus draining from a place on your knee. ¨ A fever. · You have signs of a blood clot in your leg (called a deep vein thrombosis), such as:  ¨ Pain in your calf, back of the knee, thigh, or groin. ¨ Redness and swelling in your leg or groin. Watch closely for changes in your health, and be sure to contact your doctor if:  · You have tingling, weakness, or numbness in your knee. · You have any new symptoms, such as swelling. · You have bruises from a knee injury that last longer than 2 weeks. · You do not get better as expected. Where can you learn more? Go to http://david-nancy.info/. Enter K195 in the search box to learn more about \"Knee Pain or Injury: Care Instructions. \"  Current as of: March 20, 2017  Content Version: 11.3  © 9025-7180 [employer]. Care instructions adapted under license by [employer] (which disclaims liability or warranty for this information). If you have questions about a medical condition or this instruction, always ask your healthcare professional. [address] any warranty or liability for your use of this information.

## 2017-10-17 NOTE — PROGRESS NOTES
Chief Complaint   Patient presents with    Knee Pain     left knee injury, sports related       HPI:     Wing Terrazas is a 33 y.o.  female with history of type 2 DM and dyslipidemia  here for the above complaint. She is having left knee pain. 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 Mikhail Somers turned wrong way and twisted her knee. She cannot put pressure on it. She is wearing knee brace and can walk around. It is swelling. Pain scale: 7-8/10 when puts pressure. Sharp stabbing pain with movement. She has not tried anything. She denies any chest pain, shortness of breath, abdominal pain, headaches or dizziness. Past Medical History:   Diagnosis Date    Diabetes mellitus, stable ([facility] [address] 6/2016    Dyslipidemia 6/2016    Fatty liver 7/2016    Vitamin D deficiency 6/2016     Past Surgical History:   Procedure Laterality Date    HX WISDOM TEETH EXTRACTION  at age 13 or 11yo    all 4     Current Outpatient Prescriptions   Medication Sig    fish oil-omega-3 fatty acids 340-1,000 mg capsule Take 1 Cap by mouth daily.  DOCOSAHEXANOIC ACID (PRENATAL DHA PO) Take  by mouth.  glucose blood VI test strips (ASCENSIA AUTODISC VI, ONE TOUCH ULTRA TEST VI) strip Check fasting sugars twice a day before breakfast and dinner. One touch meter    Blood-Glucose Meter monitoring kit Check fasting sugars twice a day before breakfast and dinner. One touch meter    Lancets misc Check fasting sugars twice a day before breakfast and dinner. One touch meter     No current facility-administered medications for this visit.       Health Maintenance   Topic Date Due    EYE EXAM RETINAL OR DILATED Q1  11/10/1993    HEMOGLOBIN A1C Q6M  01/13/2018    FOOT EXAM Q1  07/24/2018    MICROALBUMIN Q1  07/26/2018    LIPID PANEL Q1  07/26/2018    PAP AKA CERVICAL CYTOLOGY  09/15/2018    DTaP/Tdap/Td series (2 - Td) 07/24/2027    Pneumococcal 19-64 Medium Risk  Addressed    INFLUENZA AGE 9 TO ADULT  Addressed     Immunization History   Administered Date(s) Administered    Tdap 07/24/2017     Patient's last menstrual period was 04/06/2017. Allergies and Intolerances:   No Known Allergies    Family History:   Family History   Problem Relation Age of Onset    Diabetes Father     Heart Disease Maternal Aunt      MI in her 52's    Heart Disease Mother     Elevated Lipids Mother     Thyroid Disease Mother        Social History:   She  reports that she has never smoked. She has never used smokeless tobacco.  She  reports that she drinks alcohol. ·     OBJECTIVE:   Physical exam:   Visit Vitals    /85 (BP 1 Location: Left arm, BP Patient Position: Sitting)    Pulse 88    Temp 98.1 °F (36.7 °C) (Oral)    Resp 18    Ht 5' 6\" (1.676 m)    Wt 254 lb 6.4 oz (115.4 kg)    LMP 04/06/2017    SpO2 100%    BMI 41.06 kg/m2        Generally: Pleasant female in no acute distress  Cardiac Exam: regular, rate, and rhythm. Normal S1 and S2. No murmurs, gallops, or rubs  Pulmonary exam: Clear to auscultation bilaterally  Left knee exam: Swelling in knee area. Decrease ROM due to pain. TTP of patella.   Strength: 5/5 in lower extremities bilaterally  Reflexes: 2/2 in lower extremities bilaterally    LABS/RADIOLOGICAL TESTS:  Lab Results   Component Value Date/Time    WBC 7.3 07/26/2017 08:10 AM    HGB 13.2 07/26/2017 08:10 AM    HCT 40.3 07/26/2017 08:10 AM    PLATELET 980 66/47/0856 08:10 AM     Lab Results   Component Value Date/Time    Sodium 140 07/26/2017 08:10 AM    Potassium 4.2 07/26/2017 08:10 AM    Chloride 106 07/26/2017 08:10 AM    CO2 26 07/26/2017 08:10 AM    Glucose 91 07/26/2017 08:10 AM    BUN 10 07/26/2017 08:10 AM    Creatinine 1.12 07/26/2017 08:10 AM     Lab Results   Component Value Date/Time    Cholesterol, total 206 07/26/2017 08:10 AM    HDL Cholesterol 49 07/26/2017 08:10 AM    LDL, calculated 125 07/26/2017 08:10 AM    Triglyceride 160 07/26/2017 08:10 AM     No results found for: GPT    Previous labs    ASSESSMENT/PLAN:    1. Acute pain of left knee: continue using brace, ice, elevate, rest.   -     XR KNEE LT MIN 4 V; Future  -     MRI KNEE LT WO CONT; Future      If not better, she will let us know. 2. Patient verbalized understanding and agreement with the plan. 3. Patient was given an after-visit summary. 4.   Follow-up Disposition:  Return if symptoms worsen or fail to improve. or sooner if worsening symptoms.           Elyssa Mahoney MD

## 2017-10-17 NOTE — PROGRESS NOTES
ROOM # 2    Opal Scott Pap presents today for   Chief Complaint   Patient presents with    Knee Pain     left knee injury, sports related       [facility] preferred language for health care discussion is english/other. Is someone accompanying this pt? no    Is the patient using any DME equipment during OV? no    Depression Screening:  PHQ over the last two weeks 10/17/2017 7/24/2017 8/15/2016 6/30/2016 6/21/2016   Little interest or pleasure in doing things Not at all Not at all Not at all Not at all Not at all   Feeling down, depressed or hopeless Not at all Not at all Not at all Not at all Not at all   Total Score PHQ 2 0 0 0 0 0       Learning Assessment:  Learning Assessment 6/21/2016   PRIMARY LEARNER Patient   PRIMARY LANGUAGE ENGLISH   LEARNER PREFERENCE PRIMARY DEMONSTRATION     LISTENING     READING   ANSWERED BY patient   RELATIONSHIP SELF       Abuse Screening:  Abuse Screening Questionnaire 10/17/2017   Do you ever feel afraid of your partner? N   Are you in a relationship with someone who physically or mentally threatens you? N   Is it safe for you to go home? Y       Fall Risk  No flowsheet data found. Health Maintenance reviewed and discussed per provider. Yes    [facility] is due for eye exam (record request). Please order/place referral if appropriate. Advance Directive:  1. Do you have an advance directive in place? Patient Reply: no    2. If not, would you like material regarding how to put one in place? Patient Reply: no    Coordination of Care:  1. Have you been to the ER, urgent care clinic since your last visit? Hospitalized since your last visit? no    2. Have you seen or consulted any other health care providers outside of the [address] [place] [address] since your last visit? Include any pap smears or colon screening.  OBGYN, ophthalmology

## 2017-10-18 ENCOUNTER — TELEPHONE (OUTPATIENT)
Dept: INTERNAL MEDICINE CLINIC | Age: 32
End: 2017-10-18

## 2017-10-18 NOTE — TELEPHONE ENCOUNTER
----- Message from Gracie Haley MD sent at 10/17/2017  2:16 PM EDT -----  1) Please let pt know that left knee x-ray was normal.     2) Is she schedule for her MRI yet?

## 2017-10-18 NOTE — TELEPHONE ENCOUNTER
2 pt. Identifiers confirmed. Pt. Notified of below. Pt. states that her MRI is scheduled for tomorrow night. No other questions/concerns at this time.

## 2017-10-19 ENCOUNTER — HOSPITAL ENCOUNTER (OUTPATIENT)
Dept: MRI IMAGING | Age: 32
Discharge: HOME | End: 2017-10-19
Attending: INTERNAL MEDICINE
Payer: OTHER GOVERNMENT

## 2017-10-19 DIAGNOSIS — M25.562 ACUTE PAIN OF LEFT KNEE: ICD-10-CM

## 2017-10-19 PROCEDURE — 73721 MRI JNT OF LWR EXTRE W/O DYE: CPT

## 2017-10-20 NOTE — PROGRESS NOTES
Please let pt know that MRI showed:    1)fluid  And possible sprain. 2) no ligament and menisci tears    3) how is her knee pain? Looks like bad sprain. If not better, can refer to orthopedics.

## 2017-10-24 ENCOUNTER — TELEPHONE (OUTPATIENT)
Dept: INTERNAL MEDICINE CLINIC | Age: 32
End: 2017-10-24

## 2017-10-24 NOTE — TELEPHONE ENCOUNTER
----- Message from Dante Zhou MD sent at 10/20/2017  5:14 PM EDT -----  Please let pt know that MRI showed:    1)fluid  And possible sprain. 2) no ligament and menisci tears    3) how is her knee pain? Looks like bad sprain. If not better, can refer to orthopedics.

## 2017-10-24 NOTE — TELEPHONE ENCOUNTER
2 pt. Identifiers confirmed. Pt. Notified of below. She states she is slowly getting better and will give it a week or two and let us know if not improvement. No other questions/concerns at this time.

## 2017-11-29 ENCOUNTER — OFFICE VISIT (OUTPATIENT)
Dept: INTERNAL MEDICINE CLINIC | Age: 32
End: 2017-11-29

## 2017-11-29 ENCOUNTER — HOSPITAL ENCOUNTER (OUTPATIENT)
Dept: LAB | Age: 32
Discharge: HOME | End: 2017-11-29
Payer: OTHER GOVERNMENT

## 2017-11-29 VITALS
RESPIRATION RATE: 18 BRPM | HEIGHT: 66 IN | BODY MASS INDEX: 40.72 KG/M2 | DIASTOLIC BLOOD PRESSURE: 71 MMHG | OXYGEN SATURATION: 99 % | HEART RATE: 76 BPM | TEMPERATURE: 98.3 F | SYSTOLIC BLOOD PRESSURE: 122 MMHG | WEIGHT: 253.4 LBS

## 2017-11-29 DIAGNOSIS — E55.9 VITAMIN D DEFICIENCY: ICD-10-CM

## 2017-11-29 DIAGNOSIS — E78.5 DYSLIPIDEMIA: ICD-10-CM

## 2017-11-29 DIAGNOSIS — E11.9 DIABETES MELLITUS, STABLE (HCC): ICD-10-CM

## 2017-11-29 DIAGNOSIS — E11.9 DIABETES MELLITUS, STABLE (HCC): Primary | ICD-10-CM

## 2017-11-29 LAB
25(OH)D3 SERPL-MCNC: 21.4 NG/ML (ref 30–100)
ALBUMIN SERPL-MCNC: 4 G/DL (ref 3.4–5)
ALBUMIN/GLOB SERPL: 1.3 {RATIO} (ref 0.8–1.7)
ALP SERPL-CCNC: 77 U/L (ref 45–117)
ALT SERPL-CCNC: 42 U/L (ref 13–56)
ANION GAP SERPL CALC-SCNC: 9 MMOL/L (ref 3–18)
AST SERPL-CCNC: 20 U/L (ref 15–37)
BILIRUB SERPL-MCNC: 0.3 MG/DL (ref 0.2–1)
BUN SERPL-MCNC: 8 MG/DL (ref 7–18)
BUN/CREAT SERPL: 8 (ref 12–20)
CALCIUM SERPL-MCNC: 8.6 MG/DL (ref 8.5–10.1)
CHLORIDE SERPL-SCNC: 106 MMOL/L (ref 100–108)
CHOLEST SERPL-MCNC: 171 MG/DL
CO2 SERPL-SCNC: 23 MMOL/L (ref 21–32)
CREAT SERPL-MCNC: 0.95 MG/DL (ref 0.6–1.3)
EST. AVERAGE GLUCOSE BLD GHB EST-MCNC: 134 MG/DL
GLOBULIN SER CALC-MCNC: 3.1 G/DL (ref 2–4)
GLUCOSE SERPL-MCNC: 86 MG/DL (ref 74–99)
HBA1C MFR BLD: 6.3 % (ref 4.2–5.6)
HDLC SERPL-MCNC: 51 MG/DL (ref 40–60)
HDLC SERPL: 3.4 {RATIO} (ref 0–5)
LDLC SERPL CALC-MCNC: 98.6 MG/DL (ref 0–100)
LIPID PROFILE,FLP: NORMAL
POTASSIUM SERPL-SCNC: 4 MMOL/L (ref 3.5–5.5)
PROT SERPL-MCNC: 7.1 G/DL (ref 6.4–8.2)
SODIUM SERPL-SCNC: 138 MMOL/L (ref 136–145)
TRIGL SERPL-MCNC: 107 MG/DL (ref ?–150)
VLDLC SERPL CALC-MCNC: 21.4 MG/DL

## 2017-11-29 PROCEDURE — 83036 HEMOGLOBIN GLYCOSYLATED A1C: CPT | Performed by: INTERNAL MEDICINE

## 2017-11-29 PROCEDURE — 80061 LIPID PANEL: CPT | Performed by: INTERNAL MEDICINE

## 2017-11-29 PROCEDURE — 82306 VITAMIN D 25 HYDROXY: CPT | Performed by: INTERNAL MEDICINE

## 2017-11-29 PROCEDURE — 36415 COLL VENOUS BLD VENIPUNCTURE: CPT | Performed by: INTERNAL MEDICINE

## 2017-11-29 PROCEDURE — 80053 COMPREHEN METABOLIC PANEL: CPT | Performed by: INTERNAL MEDICINE

## 2017-11-29 NOTE — MR AVS SNAPSHOT
Visit Information Date & Time Provider Department Dept. Phone Encounter #  
 11/29/2017  8:15 AM Kashmir Naranjo MD [address] [phone] [id number] Follow-up Instructions Return if symptoms worsen or fail to improve. Upcoming Health Maintenance Date Due  
 EYE EXAM RETINAL OR DILATED Q1 11/23/2018* HEMOGLOBIN A1C Q6M 1/13/2018 FOOT EXAM Q1 7/24/2018 MICROALBUMIN Q1 7/26/2018 LIPID PANEL Q1 7/26/2018 PAP AKA CERVICAL CYTOLOGY 9/15/2018 DTaP/Tdap/Td series (2 - Td) 7/24/2027 *Topic was postponed. The date shown is not the original due date. Allergies as of 11/29/2017  Review Complete On: 11/29/2017 By: Jakob Barrow MD  
 No Known Allergies Current Immunizations  Reviewed on 11/29/2017 Name Date Tdap 7/24/2017 Reviewed by Jakob Barrow MD on 11/29/2017 at  8:39 AM  
You Were Diagnosed With   
  
 Codes Comments Diabetes mellitus, stable ([facility]    -  Primary ICD-10-CM: E11.9 ICD-9-CM: 250.00 Dyslipidemia     ICD-10-CM: E78.5 ICD-9-CM: 272.4 Vitamin D deficiency     ICD-10-CM: E55.9 ICD-9-CM: 268.9 Vitals BP Pulse Temp Resp Height(growth percentile) Weight(growth percentile) 122/71 (BP 1 Location: Left arm, BP Patient Position: Sitting) 76 98.3 °F (36.8 °C) (Oral) 18 5' 6\" (1.676 m) 253 lb 6.4 oz (114.9 kg) LMP SpO2 BMI OB Status Smoking Status 04/06/2017 99% 40.9 kg/m2 Pregnant Never Smoker Vitals History BMI and BSA Data Body Mass Index Body Surface Area 40.9 kg/m 2 2.31 m 2 Preferred Pharmacy Pharmacy Name Phone [facility] [facility], [address] [place] [address] [phone] Your Updated Medication List  
  
   
This list is accurate as of: 11/29/17  8:40 AM.  Always use your most recent med list.  
  
  
  
  
 Blood-Glucose Meter monitoring kit Check fasting sugars twice a day before breakfast and dinner. One touch meter  
  
 glucose blood VI test strips strip Commonly known as:  ASCENSIA AUTODISC VI, ONE TOUCH ULTRA TEST VI Check fasting sugars twice a day before breakfast and dinner. One touch meter Lancets Misc Check fasting sugars twice a day before breakfast and dinner. One touch meter PRENATAL DHA PO Take  by mouth. Follow-up Instructions Return if symptoms worsen or fail to improve. To-Do List   
 11/29/2017 Lab:  HEMOGLOBIN A1C WITH EAG   
  
 11/29/2017 Lab:  LIPID PANEL   
  
 11/29/2017 Lab:  METABOLIC PANEL, COMPREHENSIVE   
  
 11/29/2017 Lab:  VITAMIN D, 25 HYDROXY Patient Instructions 1) follow-up as needed or sooner if worsening symptoms. 2) follow-up with ob/gyn Introducing [facility] SERVICES! Deajayy [place] Favor: Thank you for requesting a [employer] account. Our records indicate that you already have an active [employer] account. You can access your account anytime at [email or web address] Did you know that you can access your hospital and ER discharge instructions at any time in [employer]? You can also review all of your test results from your hospital stay or ER visit. Additional Information If you have questions, please visit the Frequently Asked Questions section of the [employer] website at [email or web address] Remember, [employer] is NOT to be used for urgent needs. For medical emergencies, dial 911. Now available from your iPhone and Android! Please provide this summary of care documentation to your next provider. Your primary care clinician is listed as Mahendra Watt. If you have any questions after today's visit, please call [phone].

## 2017-11-29 NOTE — ASSESSMENT & PLAN NOTE
Stable, based on history, physical exam and review of pertinent labs, studies; meds reconciled; continue current treatment plan. Pt not on medication for her DM. Key Antihyperglycemic Medications     Patient is on no antihyperglycemic meds. Other Key Diabetic Medications             DOCOSAHEXANOIC ACID (PRENATAL DHA PO)  (Taking) Take  by mouth.         Lab Results   Component Value Date/Time    Hemoglobin A1c 5.8 07/13/2017 11:40 AM    Glucose 91 07/26/2017 08:10 AM    Creatinine 1.12 07/26/2017 08:10 AM    Microalbumin/Creat ratio (mg/g creat) 3 07/26/2017 04:09 PM    Microalbumin,urine random 0.70 07/26/2017 04:09 PM    Cholesterol, total 206 07/26/2017 08:10 AM    HDL Cholesterol 49 07/26/2017 08:10 AM    LDL, calculated 125 07/26/2017 08:10 AM    Triglyceride 160 07/26/2017 08:10 AM     Diabetic Foot and Eye Exam HM Status   Topic Date Due    Eye Exam  11/23/2018 (Originally 11/10/1993)   [address] [place] [address] Diabetic Foot Care  07/24/2018

## 2017-11-29 NOTE — PROGRESS NOTES
Chief Complaint   Patient presents with    Diabetes     4 month f/u       HPI:     Jenna Fournier is a 27 y.o.  female with history of type 2 DM and dyslipidemia  here for the above complaint. She denies any chest pain, shortness of breath, abdominal pain, headaches or dizziness. She is almost 5 weeks pregnant. She is not checking her sugars, but she is working on diet and exercise. She had eye exam with Dr. Mitzi So done on 10/6/17. She has not been checking her sugars. Check fasting sugars before breakfast and dinner. [facility], Midwife: Ninoska Contreras, which is part of Dr. Tomi Hyde group. Past Medical History:   Diagnosis Date    Diabetes mellitus, stable ([address] 6/2016    Dyslipidemia 6/2016    Fatty liver 7/2016    Vitamin D deficiency 6/2016     Past Surgical History:   Procedure Laterality Date    HX WISDOM TEETH EXTRACTION  at age 13 or 11yo    all 4     Current Outpatient Prescriptions   Medication Sig    DOCOSAHEXANOIC ACID (PRENATAL DHA PO) Take  by mouth.  glucose blood VI test strips (ASCENSIA AUTODISC VI, ONE TOUCH ULTRA TEST VI) strip Check fasting sugars twice a day before breakfast and dinner. One touch meter    Blood-Glucose Meter monitoring kit Check fasting sugars twice a day before breakfast and dinner. One touch meter    Lancets misc Check fasting sugars twice a day before breakfast and dinner. One touch meter     No current facility-administered medications for this visit.       Health Maintenance   Topic Date Due    EYE EXAM RETINAL OR DILATED Q1  11/23/2018 (Originally 11/10/1993)    HEMOGLOBIN A1C Q6M  01/13/2018    FOOT EXAM Q1  07/24/2018    MICROALBUMIN Q1  07/26/2018    LIPID PANEL Q1  07/26/2018    PAP AKA CERVICAL CYTOLOGY  09/15/2018    DTaP/Tdap/Td series (2 - Td) 07/24/2027    Pneumococcal 19-64 Medium Risk  Addressed    Influenza Age 5 to Adult  Addressed     Immunization History   Administered Date(s) Administered  Tdap 07/24/2017     Patient's last menstrual period was 04/06/2017. Allergies and Intolerances:   No Known Allergies    Family History:   Family History   Problem Relation Age of Onset    Diabetes Father     Heart Disease Maternal Aunt      MI in her 52's    Heart Disease Mother     Elevated Lipids Mother     Thyroid Disease Mother        Social History:   She  reports that she has never smoked. She has never used smokeless tobacco.  She  reports that she drinks alcohol. ·     OBJECTIVE:   Physical exam:   Visit Vitals    /71 (BP 1 Location: Left arm, BP Patient Position: Sitting)    Pulse 76    Temp 98.3 °F (36.8 °C) (Oral)    Resp 18    Ht 5' 6\" (1.676 m)    Wt 253 lb 6.4 oz (114.9 kg)    LMP 04/06/2017    SpO2 99%    BMI 40.9 kg/m2        Generally: Pleasant female in no acute distress  Cardiac Exam: regular, rate, and rhythm. Normal S1 and S2. No murmurs, gallops, or rubs  Pulmonary exam: Clear to auscultation bilaterally  Abdominal exam: Positive bowel sounds in all four quadrants, soft, nondistended, nontender  Extremities: 2+ dorsalis pedis pulses bilaterally.  No pedal edema    bilaterally    LABS/RADIOLOGICAL TESTS:  Lab Results   Component Value Date/Time    WBC 7.3 07/26/2017 08:10 AM    HGB 13.2 07/26/2017 08:10 AM    HCT 40.3 07/26/2017 08:10 AM    PLATELET 885 39/45/8630 08:10 AM     Lab Results   Component Value Date/Time    Sodium 140 07/26/2017 08:10 AM    Potassium 4.2 07/26/2017 08:10 AM    Chloride 106 07/26/2017 08:10 AM    CO2 26 07/26/2017 08:10 AM    Glucose 91 07/26/2017 08:10 AM    BUN 10 07/26/2017 08:10 AM    Creatinine 1.12 07/26/2017 08:10 AM     Lab Results   Component Value Date/Time    Cholesterol, total 206 07/26/2017 08:10 AM    HDL Cholesterol 49 07/26/2017 08:10 AM    LDL, calculated 125 07/26/2017 08:10 AM    Triglyceride 160 07/26/2017 08:10 AM     No results found for: GPT  Component      Latest Ref Rng & Units 7/26/2017 7/13/2017 8:10 AM 11:40 AM   Cholesterol, total      <200 MG/ (H)    Triglyceride      <150 MG/ (H)    HDL Cholesterol      40 - 60 MG/DL 49    LDL, calculated      0 - 100 MG/ (H)    VLDL, calculated      MG/DL 32    CHOL/HDL Ratio      0 - 5.0   4.2    Hemoglobin A1c, (calculated)      4.2 - 5.6 %  5.8 (H)   Est. average glucose      mg/dL  120     Previous labs  ASSESSMENT/PLAN:    1. Diabetes mellitus, stable ([facility] [address]): seems to be stable based on last HgA1C in 7/2017 which was 5.8. Continue diet and exercise. She is not on any medications for her DM. We will what the labs show, if her HGA1C has changed any. Assessment & Plan:  Stable, based on history, physical exam and review of pertinent labs, studies; meds reconciled; continue current treatment plan. Pt not on medication for her DM. Key Antihyperglycemic Medications     Patient is on no antihyperglycemic meds. Other Key Diabetic Medications             DOCOSAHEXANOIC ACID (PRENATAL DHA PO)  (Taking) Take  by mouth. Lab Results   Component Value Date/Time    Hemoglobin A1c 5.8 07/13/2017 11:40 AM    Glucose 91 07/26/2017 08:10 AM    Creatinine 1.12 07/26/2017 08:10 AM    Microalbumin/Creat ratio (mg/g creat) 3 07/26/2017 04:09 PM    Microalbumin,urine random 0.70 07/26/2017 04:09 PM    Cholesterol, total 206 07/26/2017 08:10 AM    HDL Cholesterol 49 07/26/2017 08:10 AM    LDL, calculated 125 07/26/2017 08:10 AM    Triglyceride 160 07/26/2017 08:10 AM     Diabetic Foot and Eye Exam HM Status   Topic Date Due    Eye Exam  11/23/2018 (Originally 11/10/1993)    Diabetic Foot Care  07/24/2018       Orders:  -     HEMOGLOBIN A1C WITH EAG; Future    2. Dyslipidemia: we will see what the labs show. Continue diet and exercise. -     METABOLIC PANEL, COMPREHENSIVE; Future  -     LIPID PANEL; Future    3. Vitamin D deficiency  -     VITAMIN D, 25 HYDROXY; Future      4. She is almost 5 weeks pregnant, so her care will go to ob/gyn. Will not make f/u appt. For her at this time. She will follow-up with me after the pregnancy. 5. Patient verbalized understanding and agreement with the plan. 6. Patient was given an after-visit summary. 7.   Follow-up Disposition:  Return if symptoms worsen or fail to improve. or sooner if worsening symptoms.     Hilda Inman M.D.

## 2017-11-29 NOTE — PROGRESS NOTES
ROOM # 2    Opal Etienne presents today for   Chief Complaint   Patient presents with    Diabetes     4 month f/u       Lucille Hamm preferred language for health care discussion is english/other. Is someone accompanying this pt? no    Is the patient using any DME equipment during OV? no    Depression Screening:  PHQ over the last two weeks 11/29/2017 10/17/2017 7/24/2017 8/15/2016 6/30/2016 6/21/2016   Little interest or pleasure in doing things Not at all Not at all Not at all Not at all Not at all Not at all   Feeling down, depressed or hopeless Not at all Not at all Not at all Not at all Not at all Not at all   Total Score PHQ 2 0 0 0 0 0 0       Learning Assessment:  Learning Assessment 6/21/2016   PRIMARY LEARNER Patient   PRIMARY LANGUAGE ENGLISH   LEARNER PREFERENCE PRIMARY DEMONSTRATION     LISTENING     READING   ANSWERED BY patient   RELATIONSHIP SELF       Abuse Screening:  Abuse Screening Questionnaire 10/17/2017   Do you ever feel afraid of your partner? N   Are you in a relationship with someone who physically or mentally threatens you? N   Is it safe for you to go home? Y       Fall Risk  No flowsheet data found. Health Maintenance reviewed and discussed per provider. Yes    Lucille Hamm is due for eye exam (record request). Please order/place referral if appropriate. Advance Directive:  1. Do you have an advance directive in place? Patient Reply: no    2. If not, would you like material regarding how to put one in place? Patient Reply: no    Coordination of Care:  1. Have you been to the ER, urgent care clinic since your last visit? Hospitalized since your last visit? no    2. Have you seen or consulted any other health care providers outside of the [address] [place] [address] since your last visit? Include any pap smears or colon screening.  no

## 2017-12-22 ENCOUNTER — TELEPHONE (OUTPATIENT)
Dept: INTERNAL MEDICINE CLINIC | Age: 32
End: 2017-12-22

## 2017-12-22 DIAGNOSIS — E11.9 DIABETES MELLITUS, STABLE (HCC): Primary | ICD-10-CM

## 2017-12-22 DIAGNOSIS — E66.01 MORBID OBESITY WITH BMI OF 40.0-44.9, ADULT (HCC): ICD-10-CM

## 2017-12-26 NOTE — TELEPHONE ENCOUNTER
2 pt. Identifiers confirmed. Pt. Requesting below for prenatal reasons as well as Pre DM. No other questions/concerns at this time. Dr. Robin Mehta, please advise.

## 2018-03-30 ENCOUNTER — HOSPITAL ENCOUNTER (EMERGENCY)
Age: 33
Discharge: STILL ADMITTED OR ADMINISTRATIVE | End: 2018-03-30
Attending: EMERGENCY MEDICINE
Payer: OTHER GOVERNMENT

## 2018-03-30 ENCOUNTER — HOSPITAL ENCOUNTER (OUTPATIENT)
Age: 33
Setting detail: OBSERVATION
Discharge: HOME | End: 2018-03-30
Attending: OBSTETRICS & GYNECOLOGY | Admitting: SPECIALIST
Payer: OTHER GOVERNMENT

## 2018-03-30 VITALS
OXYGEN SATURATION: 98 % | HEART RATE: 101 BPM | DIASTOLIC BLOOD PRESSURE: 83 MMHG | HEIGHT: 66 IN | TEMPERATURE: 98.4 F | RESPIRATION RATE: 16 BRPM | SYSTOLIC BLOOD PRESSURE: 136 MMHG

## 2018-03-30 PROBLEM — R10.12 LEFT UPPER QUADRANT PAIN: Status: ACTIVE | Noted: 2018-03-30

## 2018-03-30 LAB
APPEARANCE UR: CLEAR
BASOPHILS # BLD: 0 K/UL (ref 0–0.06)
BASOPHILS NFR BLD: 0 % (ref 0–2)
BILIRUB UR QL: NEGATIVE
COLOR UR: YELLOW
DIFFERENTIAL METHOD BLD: ABNORMAL
EOSINOPHIL # BLD: 0.2 K/UL (ref 0–0.4)
EOSINOPHIL NFR BLD: 1 % (ref 0–5)
ERYTHROCYTE [DISTWIDTH] IN BLOOD BY AUTOMATED COUNT: 13.7 % (ref 11.6–14.5)
GLUCOSE UR QL STRIP.AUTO: NEGATIVE MG/DL
HCT VFR BLD AUTO: 33.5 % (ref 35–45)
HGB BLD-MCNC: 11.6 G/DL (ref 12–16)
KETONES UR-MCNC: NEGATIVE MG/DL
LEUKOCYTE ESTERASE UR QL STRIP: NEGATIVE
LYMPHOCYTES # BLD: 1.8 K/UL (ref 0.9–3.6)
LYMPHOCYTES NFR BLD: 14 % (ref 21–52)
MCH RBC QN AUTO: 30.7 PG (ref 24–34)
MCHC RBC AUTO-ENTMCNC: 34.6 G/DL (ref 31–37)
MCV RBC AUTO: 88.6 FL (ref 74–97)
MONOCYTES # BLD: 0.9 K/UL (ref 0.05–1.2)
MONOCYTES NFR BLD: 7 % (ref 3–10)
NEUTS SEG # BLD: 10.4 K/UL (ref 1.8–8)
NEUTS SEG NFR BLD: 78 % (ref 40–73)
NITRITE UR QL: NEGATIVE
PH UR: 6.5 [PH] (ref 5–9)
PLATELET # BLD AUTO: 255 K/UL (ref 135–420)
PMV BLD AUTO: 10.5 FL (ref 9.2–11.8)
PROT UR QL: NEGATIVE MG/DL
RBC # BLD AUTO: 3.78 M/UL (ref 4.2–5.3)
RBC # UR STRIP: NEGATIVE /UL
SERVICE CMNT-IMP: NORMAL
SP GR UR: 1.02 (ref 1–1.02)
UROBILINOGEN UR QL: 1 EU/DL (ref 0.2–1)
WBC # BLD AUTO: 13.3 K/UL (ref 4.6–13.2)

## 2018-03-30 PROCEDURE — 36415 COLL VENOUS BLD VENIPUNCTURE: CPT | Performed by: ADVANCED PRACTICE MIDWIFE

## 2018-03-30 PROCEDURE — 75810000275 HC EMERGENCY DEPT VISIT NO LEVEL OF CARE

## 2018-03-30 PROCEDURE — 81003 URINALYSIS AUTO W/O SCOPE: CPT

## 2018-03-30 PROCEDURE — 85025 COMPLETE CBC W/AUTO DIFF WBC: CPT | Performed by: ADVANCED PRACTICE MIDWIFE

## 2018-03-30 PROCEDURE — 99284 EMERGENCY DEPT VISIT MOD MDM: CPT

## 2018-03-30 PROCEDURE — 99218 HC RM OBSERVATION: CPT

## 2018-03-30 RX ORDER — CHOLECALCIFEROL TAB 125 MCG (5000 UNIT) 125 MCG
5000 TAB ORAL DAILY
COMMUNITY

## 2018-03-30 RX ORDER — LEVOTHYROXINE SODIUM 25 UG/1
25 TABLET ORAL
COMMUNITY
End: 2019-08-31

## 2018-03-30 NOTE — PROGRESS NOTES
[facility] Georgina Hernadez made aware that the CBC results are in.  1845 Dr. Senait Valverde was paged and made aware of the pt's condition, will see and evaluate.

## 2018-03-30 NOTE — ROUTINE PROCESS
1600 Patient arrived to unit via wheelchair, with c/o abdominal pain, describes as tightness, denies any vaginal bleeding or leaking of fluid. Urine sample provided. Patient has been given gown and placed on TOCO and FHR monitoring. [address] , FLORA notified of patient's arrival, c/o abdominal tightness, UA results reviewed. CNM notified of tachycardia. 1635 Bedside and Verbal shift change report given to Chucho Block RN (oncoming nurse) by Chacho Morataya RN (offgoing nurse). Report included the following information SBAR, Kardex, Intake/Output, MAR and Recent Results.

## 2018-03-30 NOTE — IP AVS SNAPSHOT
[address] Patient: Bud Yee MRN: [id number] QEP:[id number] A check madyson indicates which time of day the medication should be taken. My Medications ASK your doctor about these medications Instructions Each Dose to Equal  
 Morning Noon Evening Bedtime Blood-Glucose Meter monitoring kit Your last dose was: Your next dose is:    
   
   
 Check fasting sugars twice a day before breakfast and dinner. One touch meter  
     
   
   
   
  
 cholecalciferol (VITAMIN D3) 5,000 unit Tab tablet Commonly known as:  VITAMIN D3 Your last dose was: Your next dose is: Take 5,000 Units by mouth daily. 5000 Units  
    
   
   
   
  
 glucose blood VI test strips strip Commonly known as:  ASCENSIA AUTODISC VI, ONE TOUCH ULTRA TEST VI Your last dose was: Your next dose is:    
   
   
 Check fasting sugars twice a day before breakfast and dinner. One touch meter Lancets Misc Your last dose was: Your next dose is:    
   
   
 Check fasting sugars twice a day before breakfast and dinner. One touch meter  
     
   
   
   
  
 levothyroxine 25 mcg tablet Commonly known as:  SYNTHROID Your last dose was: Your next dose is: Take 25 mcg by mouth Daily (before breakfast). 25 mcg PRENATAL DHA PO Your last dose was: Your next dose is: Take  by mouth. PRENATAL DHA+COMPLETE PRENATAL -300 mg-mcg-mg Cmpk Generic drug:  [id number] aby-folic-dha Your last dose was: Your next dose is: Take  by mouth. Indications: pregnancy

## 2018-03-30 NOTE — IP AVS SNAPSHOT
[place] [address] Patient: Corbin Jose MRN: [id number] SPR:11/44/6669 About your hospitalization You were admitted on:  March 30, 2018 You last received care in the:  [address] [place] [address] You were discharged on:  March 30, 2018 Why you were hospitalized Your primary diagnosis was:  Not on File Your diagnoses also included:  Left Upper Quadrant Pain Follow-up Information None Discharge Orders None A check madyson indicates which time of day the medication should be taken. My Medications ASK your doctor about these medications Instructions Each Dose to Equal  
 Morning Noon Evening Bedtime Blood-Glucose Meter monitoring kit Your last dose was: Your next dose is:    
   
   
 Check fasting sugars twice a day before breakfast and dinner. One touch meter  
     
   
   
   
  
 cholecalciferol (VITAMIN D3) 5,000 unit Tab tablet Commonly known as:  VITAMIN D3 Your last dose was: Your next dose is: Take 5,000 Units by mouth daily. 5000 Units  
    
   
   
   
  
 glucose blood VI test strips strip Commonly known as:  ASCENSIA AUTODISC VI, ONE TOUCH ULTRA TEST VI Your last dose was: Your next dose is:    
   
   
 Check fasting sugars twice a day before breakfast and dinner. One touch meter Lancets Misc Your last dose was: Your next dose is:    
   
   
 Check fasting sugars twice a day before breakfast and dinner. One touch meter  
     
   
   
   
  
 levothyroxine 25 mcg tablet Commonly known as:  SYNTHROID Your last dose was: Your next dose is: Take 25 mcg by mouth Daily (before breakfast). 25 mcg PRENATAL DHA PO Your last dose was: Your next dose is: Take  by mouth. PRENATAL DHA+COMPLETE PRENATAL -300 mg-mcg-mg Cmpk Generic drug:  AARRHTQH67-DYKH aby-folic-dha Your last dose was: Your next dose is: Take  by mouth. Indications: pregnancy Discharge Instructions None Introducing [facility]! Dear Adam Murillo: Thank you for requesting a [employer] account. Our records indicate that you already have an active [employer] [facility] account. You can access your account anytime at [email or web address] Did you know that you can access your hospital and ER discharge instructions at any time in [employer]? You can also review all of your test results from your hospital stay or ER visit. Additional Information If you have questions, please visit the Frequently Asked Questions section of the [employer] website at [email or web address] Remember, [facility] is NOT to be used for urgent needs. For medical emergencies, dial 911. Now available from your iPhone and Android! Introducing Rolly Stewart As a Dale Cannon patient, I wanted to make you aware of our electronic visit tool called Rolly CutlerAccellos. [facility] 24/7 allows you to connect within minutes with a medical provider 24 hours a day, seven days a week via a mobile device or tablet or logging into a secure website from your computer. You can access Rolly Stewart from anywhere in the [place]. A virtual visit might be right for you when you have a simple condition and feel like you just dont want to get out of bed, or cant get away from work for an appointment, when your regular [facility] Cannon provider is not available (evenings, weekends or holidays), or when youre out of town and need minor care.   Electronic visits cost only $49 and if the Rolly Stewart provider determines a prescription is needed to treat your condition, one can be electronically transmitted to a nearby pharmacy*. Please take a moment to enroll today if you have not already done so. The enrollment process is free and takes just a few minutes. To enroll, please download the [employer]([facility]) 24/7 ashlee to your tablet or phone, or visit [email or web address] to enroll on your computer. And, as an [address] [place] [address] patient with a [employer] account, the results of your visits will be scanned into your electronic medical record and your primary care provider will be able to view the scanned results. We urge you to continue to see your regular [employer]) provider for your ongoing medical care. And while your primary care provider may not be the one available when you seek a [facility] virtual visit, the peace of mind you get from getting a real diagnosis real time can be priceless. For more information on [facility], view our Frequently Asked Questions (FAQs) at [email or web address]. Sincerely, 
 
Saba Meza MD 
Chief Medical Officer [address] [place] [address] *:  certain medications cannot be prescribed via [facility]IST[facility] Consult Providers Seen During Your Hospitalization Provider Specialty Primary office phone Sivan Stoner MD Obstetrics & Gynecology [phone] Your Primary Care Physician (PCP) Primary Care Physician Office Phone Office Fax [address] [phone] You are allergic to the following No active allergies Recent Documentation Height OB Status Smoking Status 1.676 m Pregnant Never Smoker Emergency Contacts Name Discharge Info Relation Home Work Mobile Arie Blake DISCHARGE CAREGIVER [3] Spouse [3] [phone] Mak Blake  Spouse [3] [phone] Patient Belongings The following personal items are in your possession at time of discharge: Discharge Instructions Attachments/References PREGNANCY: WHEN TO CALL (AFTER 20 WEEKS): GENERAL INFO (ENGLISH) PREGNANCY: WEEKS 22 TO 26 (ENGLISH) Patient Handouts Learning About When to Call Your Doctor During Pregnancy (After 20 Weeks) Your Care Instructions It's common to have concerns about what might be a problem during pregnancy. Although most pregnant women don't have any serious problems, it's important to know when to call your doctor if you have certain symptoms or signs of labor. These are general suggestions. Your doctor may give you some more information about when to call. When to call your doctor (after 20 weeks) Call 911 anytime you think you may need emergency care. For example, call if: 
· You have severe vaginal bleeding. · You have sudden, severe pain in your belly. · You passed out (lost consciousness). · You have a seizure. · You see or feel the umbilical cord. · You think you are about to deliver your baby and can't make it safely to the hospital. 
Call your doctor now or seek immediate medical care if: 
· You have vaginal bleeding. · You have belly pain. · You have a fever. · You have symptoms of preeclampsia, such as: 
¨ Sudden swelling of your face, hands, or feet. ¨ New vision problems (such as dimness or blurring). ¨ A severe headache. · You have a sudden release of fluid from your vagina. (You think your water broke.) · You think that you may be in labor. This means that you've had at least 4 contractions within 20 minutes or at least 8 contractions in an hour. · You notice that your baby has stopped moving or is moving much less than normal. 
· You have symptoms of a urinary tract infection. These may include: 
¨ Pain or burning when you urinate. ¨ A frequent need to urinate without being able to pass much urine. ¨ Pain in the flank, which is just below the rib cage and above the waist on either side of the back. ¨ Blood in your urine. Watch closely for changes in your health, and be sure to contact your doctor if: 
· You have vaginal discharge that smells bad. · You have skin changes, such as: ¨ A rash. ¨ Itching. ¨ Yellow color to your skin. · You have other concerns about your pregnancy. If you have labor signs at 37 weeks or more If you have signs of labor at 37 weeks or more, your doctor may tell you to call when your labor becomes more active. Symptoms of active labor include: 
· Contractions that are regular. · Contractions that are less than 5 minutes apart. · Contractions that are hard to talk through. Follow-up care is a key part of your treatment and safety. Be sure to make and go to all appointments, and call your doctor if you are having problems. It's also a good idea to know your test results and keep a list of the medicines you take. Where can you learn more? Go to http://david-nancy.info/. Enter  in the search box to learn more about \"Learning About When to Call Your Doctor During Pregnancy (After 20 Weeks). \" 
Current as of: March 16, 2017 Content Version: 11.4 © 0881-3539 [employer]. Care instructions adapted under license by [employer] (which disclaims liability or warranty for this information). If you have questions about a medical condition or this instruction, always ask your healthcare professional. [place] [address] any warranty or liability for your use of this information. Weeks 22 to 26 of Your Pregnancy: Care Instructions Your Care Instructions As you enter your 7th month of pregnancy at week 26, your baby's lungs are growing stronger and getting ready to breathe. You may notice that your baby responds to the sound of your or your partner's voice. You may also notice that your baby does less turning and twisting and more squirming or jerking. Jerking often means that your baby has the hiccups.  Hiccups are perfectly normal and are only temporary. You may want to think about attending a childbirth preparation class. This is also a good time to start thinking about whether you want to have pain medicine during labor. Most pregnant women are tested for gestational diabetes between weeks 25 and 28. Gestational diabetes occurs when your blood sugar level gets too high when you're pregnant. The test is important, because you can have gestational diabetes and not know it. But the condition can cause problems for your baby. Follow-up care is a key part of your treatment and safety. Be sure to make and go to all appointments, and call your doctor if you are having problems. It's also a good idea to know your test results and keep a list of the medicines you take. How can you care for yourself at home? Ease discomfort from your baby's kicking · Change your position. Sometimes this will cause your baby to change position too. · Take a deep breath while you raise your arm over your head. Then breathe out while you drop your arm. Do Kegel exercises to prevent urine from leaking · You can do Kegel exercises while you stand or sit. ¨ Squeeze the same muscles you would use to stop your urine. Your belly and thighs should not move. ¨ Hold the squeeze for 3 seconds, and then relax for 3 seconds. ¨ Start with 3 seconds. Then add 1 second each week until you are able to squeeze for 10 seconds. ¨ Repeat the exercise 10 to 15 times for each session. Do three or more sessions each day. Ease or reduce swelling in your feet, ankles, hands, and fingers · If your fingers are puffy, take off your rings. · Do not eat high-salt foods, such as potato chips. · Prop up your feet on a stool or couch as much as possible. Sleep with pillows under your feet. · Do not stand for long periods of time or wear tight shoes. · Wear support stockings. Where can you learn more? Go to http://david-nancy.info/. Enter G264 in the search box to learn more about \"Weeks 22 to 26 of Your Pregnancy: Care Instructions. \" Current as of: March 16, 2017 Content Version: 11.4 © 2006-2017 [employer]. Care instructions adapted under license by [employer] (which disclaims liability or warranty for this information). If you have questions about a medical condition or this instruction, always ask your healthcare professional. [address] any warranty or liability for your use of this information. Please provide this summary of care documentation to your next provider. Signatures-by signing, you are acknowledging that this After Visit Summary has been reviewed with you and you have received a copy. Patient Signature:  ____________________________________________________________ Date:  ____________________________________________________________  
  
[facility] Provider Signature:  ____________________________________________________________ Date:  ____________________________________________________________

## 2018-03-30 NOTE — H&P
History & Physical  IDENTIFYING DATA  Patient's Name:  Andrei Ramirez. MRN: [id number]. : 1985. Date of Service:  3/30/2018  7:37 PM  Physician:  Romel Abernathy visit moderate complexity, reviewed entire prenatal record from her office took a careful history did a physical and performed a limited ultrasound. Face-to-face time 40 minutes In the hospital    CHIEF COMPLAINT: LUQ pain. OB HISTORY:    OB History      Para Term  AB Living    2 0 0 0 0 0    SAB TAB Ectopic Molar Multiple Live Births    0 0 0  0         IMPRESSION:    Indication:   Andrei Ramirez is a  27 y.o. pregnant patient at 21w4d weeks. Estimated Date of Delivery: 18. LUQ pain by hx. LUQ Fibroid at 21w 4d, palpable and tender. Fibroid visualized on Ultrasound. RECOMMENDATIONS:    Rest, Avoid aggravating stimulants, consider maternity support belt. F/U with [address] [place] [address],[address] if worse or / and for interval ultrasound in 3 weeks. Reassurance provided. 2 days LUQ pain, no fever, chils, vomiting or diarrhea. Mild nausea and constipation. No hx or Sx of UTI and Urine dip negative/normal.  No prior episodes, no surgery, no accidents or trauma. Patient relates hx 3 fibroids found at Ultrasound. Slightly reduced appetite. Denies Flank pain. Hematuria or musculoskeletal injury. Urine dipstick here is negative for leukocyte    FHR stable baseline. ULTRASOUND REPORT:  Images pulled up and reviewed. Appears to be 8 cm diameter LUQ myoma. TAS FOR MORPH. NEG FTS/MSAFP. BMI 42. KNOWN FIBROIDS. TAS: SIUP. MALE. VTX OBLIQUE. AGA; EFW 56TH%. LTD VIEWS OF THE FETAL HEART. HANDS AND FEET DUE TO FETAL LIE. REMAINING ANATOMY APPEARS NORMAL. GOOD FETAL M/T ARE OBSERVED. LOBULAR POSTERIOR PLACENTA W/ SUSPECTED CENTRAL CI. SDP 5.5 CM. NEG ADNEXA. CX 4.4 CM. SUBOPTIMAL VIEWS OF THE PREVIOUSLY NOTED FIBROIDS, HOWEVER APPEAR TO BE LOCATED IN THE LUQ OF THE MATERNAL ABDOMEN.  F/U SCAN IS SCHEDULED AROUND 24 WEEKS FOR REASSESSMENT AND INTERVAL GROWTH./bhavya     I powered on the birthing center unit [employer] ultrasound and with a goal of demonstrating the left mass-effect that was palpable was In fact a fibroid. The abdominal scan Clearly demonstrated a 9 cm mass corresponding to what was palpable in and strongly consistent with an predictive of an intramural leiomyoma. We discussed the 1 in 400 or less risk of malignancy and the possible consequences for this pregnancy. I talked about recurring episodes of discomfort that may in fact become disabling. Treatment options were reviewed to include limitations on activity, a maternity support belt, low-dose intermittent Tylenol and a medication such as Benadryl or doxylamine succinate as a sedative. Her questions were answered and she was encouraged to keep a follow-up appointment at [facility]. Subjective:   PAST MEDICAL HISTORY:   Past Medical History:   Diagnosis Date    Diabetes mellitus, stable ([facility] [address] 6/2016    Dyslipidemia 6/2016    Fatty liver 7/2016    Vitamin D deficiency 6/2016       PAST SURGICAL HISTORY:   Past Surgical History:   Procedure Laterality Date    HX WISDOM TEETH EXTRACTION  at age 13 or 11yo    all 4       SOCIAL HISTORY:    Social History     Social History    Marital status:      Spouse name: N/A    Number of children: N/A    Years of education: N/A     Occupational History    Not on file. Social History Main Topics    Smoking status: Never Smoker    Smokeless tobacco: Never Used      Comment: Pt counseled to continue to not smoke.      Alcohol use 0.0 oz/week     0 Standard drinks or equivalent per week      Comment: occasionally    Drug use: No    Sexual activity: Yes     Partners: Male     Birth control/ protection: None     Other Topics Concern     Service Yes     spouse    Blood Transfusions No    Caffeine Concern No    Occupational Exposure No    Hobby Hazards No    Sleep Concern No    Stress Concern No    Weight Concern Yes    Exercise Yes    Seat Belt Yes    Self-Exams Yes     Social History Narrative       FAMILY HISTORY    Family History   Problem Relation Age of Onset    Diabetes Father     Heart Disease Maternal Aunt      MI in her 52's    Heart Disease Mother     Elevated Lipids Mother     Thyroid Disease Mother        ALLERGY:  No Known Allergies    HOME MEDICATIONS:   Prior to Admission medications    Medication Sig Start Date End Date Taking? Authorizing Provider   levothyroxine (SYNTHROID) 25 mcg tablet Take 25 mcg by mouth Daily (before breakfast). Yes Historical Provider   [id number]-CEVR aby-folic-dha (PRENATAL DHA+COMPLETE PRENATAL) [id number] mg-mcg-mg cmpk Take  by mouth. Indications: pregnancy   Yes Historical Provider   cholecalciferol, VITAMIN D3, (VITAMIN D3) 5,000 unit tab tablet Take 5,000 Units by mouth daily. Yes Historical Provider   DOCOSAHEXANOIC ACID (PRENATAL DHA PO) Take  by mouth. Yes Historical Provider   glucose blood VI test strips (ASCENSIA AUTODISC VI, ONE TOUCH ULTRA TEST VI) strip Check fasting sugars twice a day before breakfast and dinner. One touch meter 6/25/16  Yes Mahendra Watt MD   Blood-Glucose Meter monitoring kit Check fasting sugars twice a day before breakfast and dinner. One touch meter 6/25/16  Yes Mahendra Watt MD   Lancets misc Check fasting sugars twice a day before breakfast and dinner. One touch meter 6/25/16  Yes Mahendra Watt MD          CERVICAL EXAM: (data input under \"more activities\" and \"flowsheets\" at the top.)       FETAL MONITORING:  Baseline FHR: stable, reassuring. REVIEW OF SYMPTOMS:   Constitutional: fever, chills denied. Head, Ears, Nose Throat:   Ear pain denied. Sore throat denied. Eyes: Pain denied. Visual disturbance denied. Respiratory: Cough denied. Shortness of breath denied. Cardiovascular: Chest pains denied. Gastrointestinal:  Vomiting, Diarrhea denied.  Mild Nausea, constipation reported. Genitourinary: Vaginal Bleeding, Vaginal Odor, Painful Urination, Blood in Urine denied. Pain over kidneys denied. Musculoskeletal: Back pain, Joint pain denied. Skin:  Rash denied. Injuries denied. Neurological:  Headaches, Dizziness, Seizures denied. Psychiatric/Behavioral: Major mood problems denied. Objective:     Physical Exam:     Visit Vitals    /79    Pulse (!) 105    Temp 98 °F (36.7 °C)    Resp 16    Ht 5' 6\" (1.676 m)    SpO2 98%     General appearance:   Alert, oriented to person, place, and time, cooperative, no distress, appears stated age and is well-developed and well-nourished. Head, Nose, Throat: Normocephalic, atraumatic. Eyes: Conjunctivae appear normal.  Pupils are equal and round. Neck: Normal range of motion. Supple. Heart:  Regular rate and Rhythm. Lungs: Effort normal, No apparent respiratory distress, Wheezes or Cough. Abdomen: Term pregnancy appropriate size. Abnormal tenderness present over LUQ 8 cm firm mass effect c/w Myoma seen on Ultrasound. Scars None detected. External genitalia: normal appearance without obvious lesions. Bartholin's,  [place]'s, Urethra: Normal.   Vaginal Vault:  Discharge or Odor not detected. Blood not detected. Foreign body or Injury not detected. Uterus:  Appropriate size for gestational age. Musculoskeletal: Normal range of motion. Neurological: Loss of strength or sensation not detected. Disorientation to time, person, place not detected. Skin: Lesions not detected. Rashes not detected. Extremities:  Trauma,  Swelling or edema not detected. Psychiatric: Abnormal mood or affect not detected.      Signed By:    Brittany Wilson MD  March 30, 2018 7:37 PM    [facility]   PATIENT INFORMATION      CSN:  [id number]                              Patient Name: Antonio Baez ID: [id number]   Address: [address] [place] [address] ,[facility] [address]    Sex: Female  Mar Stat:    : 1985 Age:   27 yrs   Home Phone: [phone] Mobile Phone:   [phone]   Work Phone: [phone] Employer:   [employer]   Race: BLACK OR   Latter day:   NO PREFERENCE    ADMISSION INFORMATION   Adm Date: 3/30/2018 Adm Time: 4169   Patient Class: Observation Service: Obstetrics   Adm Source: Non-health care facility* Adm Type: URGENT   Admitting Prov: Noel Gonzales Attending Prov: Arnold Kaplan   Unit: [facility] 3 Labor & Delivery Room/Bed: 341/    Adm Diagnosis: Maternity; Left upper quadrant pain                                                     Disch Date:   Disch Time:     GUARANTOR INFORMATION   Name:  Michelel Medel Phone: [phone] Rel :  Self   Address: [address]   Name: Anna Clark Phone: [phone] Rel: Spouse;Spouse   COVERAGE INFORMATION   Primary Ins:    Insured Name: [employer]   Plan Name: [address] [place] [address]       Claim Address: [address] [place] [address]  Kaitlin Carrillo [place] [address] Rel to Patient: Spouse      Sex: Male      Policy #:  [id number]    Group #   Group Name:   Crissy Rodriguez #: N/A Ins Phone:     Secondary Ins:   Insured Name:     Claim Address: NA Rel to Patient: N/A      Sex:        Policy #: N/A    Group #: N/A Group Name: N/A   Auth #: N/A Ins Phone:     Accident Date:    Accident Type:     PROVIDER INFORMATION   PCP:         Sandi Huber MD PCP Phone:  [phone]   Referring Prov:   No ref.  provider found Referring Phone:  N/A   Advanced Directive:  Not Received Language:   ENGLISH

## 2018-03-31 NOTE — PROGRESS NOTES
2100 - Dr. Francis Raymundo at bedside for assessment. Orders to discharge after ultrasound findings within normal limits. 2140 - Assumed care for discharge. SBAR received from NEDA Jackson RN. 2145 - Head to toe assessment completed. Patient denies any increased pain. Vital signs stable. Spot check of FHR within normal limits. Denies bleeding or leaking of fluid. Educated on reasons to return, follow up and labor precautions patient voiced understandin. 2151 - Discharged to home in wheelchair with auxiliary staff and spouse.

## 2018-03-31 NOTE — PROGRESS NOTES
Bedside and Verbal shift change report given to Prachi Mcfadden RN (oncoming nurse) by ELIZ Hoskins (offgoing nurse). Report included the following information SBAR, Kardex, Intake/Output, MAR and Recent Results.

## 2018-10-17 ENCOUNTER — HOSPITAL ENCOUNTER (OUTPATIENT)
Dept: LAB | Age: 33
Discharge: HOME | End: 2018-10-17
Payer: OTHER GOVERNMENT

## 2018-10-17 PROCEDURE — 87624 HPV HI-RISK TYP POOLED RSLT: CPT | Performed by: ADVANCED PRACTICE MIDWIFE

## 2018-10-17 PROCEDURE — 88175 CYTOPATH C/V AUTO FLUID REDO: CPT | Performed by: ADVANCED PRACTICE MIDWIFE

## 2019-01-22 ENCOUNTER — DOCUMENTATION ONLY (OUTPATIENT)
Dept: INTERNAL MEDICINE CLINIC | Age: 34
End: 2019-01-22

## 2019-01-22 ENCOUNTER — OFFICE VISIT (OUTPATIENT)
Dept: INTERNAL MEDICINE CLINIC | Age: 34
End: 2019-01-22

## 2019-01-22 VITALS
HEART RATE: 77 BPM | RESPIRATION RATE: 16 BRPM | HEIGHT: 66 IN | WEIGHT: 246 LBS | OXYGEN SATURATION: 96 % | DIASTOLIC BLOOD PRESSURE: 72 MMHG | BODY MASS INDEX: 39.53 KG/M2 | TEMPERATURE: 98 F | SYSTOLIC BLOOD PRESSURE: 108 MMHG

## 2019-01-22 DIAGNOSIS — M79.645 PAIN OF LEFT THUMB: Primary | ICD-10-CM

## 2019-01-22 NOTE — PROGRESS NOTES
ROOM # 3    Opal Valverde presents today for   Chief Complaint   Patient presents with    Hand Pain     left thumb pain from incident while walking dog in [id number]       Santana Parry preferred language for health care discussion is english/other. Is someone accompanying this pt? no    Is the patient using any DME equipment during OV? no    Depression Screening:  PHQ over the last two weeks 11/29/2017 10/17/2017 7/24/2017 8/15/2016 6/30/2016 6/21/2016   Little interest or pleasure in doing things Not at all Not at all Not at all Not at all Not at all Not at all   Feeling down, depressed, irritable, or hopeless Not at all Not at all Not at all Not at all Not at all Not at all   Total Score PHQ 2 0 0 0 0 0 0       Learning Assessment:  Learning Assessment 6/21/2016   PRIMARY LEARNER Patient   PRIMARY LANGUAGE ENGLISH   LEARNER PREFERENCE PRIMARY DEMONSTRATION     LISTENING     READING   ANSWERED BY patient   RELATIONSHIP SELF       Abuse Screening:  Abuse Screening Questionnaire 10/17/2017   Do you ever feel afraid of your partner? N   Are you in a relationship with someone who physically or mentally threatens you? N   Is it safe for you to go home? Y       Fall Risk  No flowsheet data found. Health Maintenance reviewed and discussed per provider. Yes    Santana Parry is due for   Health Maintenance Due   Topic Date Due    HEMOGLOBIN A1C Q6M  05/29/2018    FOOT EXAM Q1  07/24/2018    MICROALBUMIN Q1  07/26/2018    LIPID PANEL Q1  11/29/2018   HM to be d/w rpovider      Please order/place referral if appropriate. Advance Directive:  1. Do you have an advance directive in place? Patient Reply: no    2. If not, would you like material regarding how to put one in place? Patient Reply: no    Coordination of Care:  1. Have you been to the ER, urgent care clinic since your last visit? Hospitalized since your last visit? no    2.  Have you seen or consulted any other health care providers outside of the [facility] since your last visit? Include any pap smears or colon screening.  no

## 2019-01-22 NOTE — PROGRESS NOTES
Chief Complaint   Patient presents with    Hand Pain     left thumb pain from incident while walking dog in 2018       HPI:     Mitch Jimenez is a 26 y.o.  female with history of  Type 2 DM and dyslipidemia  here for the above complaint. She thiks early December to popped out and then came back. She said when she does something radiates up the wrist and arm. He was pushing stroller and walking dog and then pulled how thumb. Pain scale: 6-7/10. No numbness or tingling. Sharp shooting pain. She has not tried anything for pain. She has not tried anything for the pain. She denies any chest pain, shortness of breath, abdominal pain, headaches or dizziness. Past Medical History:   Diagnosis Date    Diabetes mellitus, stable ([facility] [address] 2016    Dyslipidemia 2016    Fatty liver 2016    Vitamin D deficiency 2016     Past Surgical History:   Procedure Laterality Date    HX  SECTION  2018    HX WISDOM TEETH EXTRACTION  at age 13 or 11yo    all 4     Current Outpatient Medications   Medication Sig    levothyroxine (SYNTHROID) 25 mcg tablet Take 25 mcg by mouth Daily (before breakfast).  cholecalciferol, VITAMIN D3, (VITAMIN D3) 5,000 unit tab tablet Take 5,000 Units by mouth daily.  DOCOSAHEXANOIC ACID (PRENATAL DHA PO) Take  by mouth.  glucose blood VI test strips (ASCENSIA AUTODISC VI, ONE TOUCH ULTRA TEST VI) strip Check fasting sugars twice a day before breakfast and dinner. One touch meter    Blood-Glucose Meter monitoring kit Check fasting sugars twice a day before breakfast and dinner. One touch meter    Lancets misc Check fasting sugars twice a day before breakfast and dinner. One touch meter     No current facility-administered medications for this visit.       Health Maintenance   Topic Date Due    HEMOGLOBIN A1C Q6M  2018    FOOT EXAM Q1  2018    MICROALBUMIN Q1  2018    LIPID PANEL Q1  2018    EYE EXAM RETINAL OR DILATED  10/09/2019    PAP AKA CERVICAL CYTOLOGY  10/17/2021    DTaP/Tdap/Td series (2 - Td) 07/24/2027    Influenza Age 9 to Adult  Completed    Pneumococcal 19-64 Medium Risk  Addressed     Immunization History   Administered Date(s) Administered    Influenza Vaccine 10/23/2018    Tdap 07/24/2017     No LMP recorded. Allergies and Intolerances:   No Known Allergies    Family History:   Family History   Problem Relation Age of Onset    Diabetes Father     Heart Disease Maternal Aunt         MI in her 52's    Heart Disease Mother     Elevated Lipids Mother     Thyroid Disease Mother        Social History:   She  reports that  has never smoked. she has never used smokeless tobacco.  She  reports that she drinks alcohol. ·     OBJECTIVE:   Physical exam:   Visit Vitals  /72 (BP 1 Location: Left arm, BP Patient Position: Sitting)   Pulse 77   Temp 98 °F (36.7 °C) (Oral)   Resp 16   Ht 5' 6\" (1.676 m)   Wt 246 lb (111.6 kg)   SpO2 96%   BMI 39.71 kg/m²        Generally: Pleasant female in no acute distress  Cardiac Exam: regular, rate, and rhythm. Normal S1 and S2. No murmurs, gallops, or rubs  Pulmonary exam: Clear to auscultation bilaterally  Abdominal exam: Positive bowel sounds in all four quadrants, soft, nondistended, nontender  Extremities: 2+ dorsalis pedis pulses bilaterally. No pedal edema    bilaterally  Left thumb/wrist exam: Good ROM with the thumb, no TTP. 2+ radial pulses bilaterally.  5/5 hand  bilaterally    LABS/RADIOLOGICAL TESTS:  Lab Results   Component Value Date/Time    WBC 13.3 (H) 03/30/2018 05:00 PM    HGB 11.6 (L) 03/30/2018 05:00 PM    HCT 33.5 (L) 03/30/2018 05:00 PM    PLATELET 060 89/02/5580 05:00 PM     Lab Results   Component Value Date/Time    Sodium 138 11/29/2017 08:47 AM    Potassium 4.0 11/29/2017 08:47 AM    Chloride 106 11/29/2017 08:47 AM    CO2 23 11/29/2017 08:47 AM    Glucose 86 11/29/2017 08:47 AM    BUN 8 11/29/2017 08:47 AM Creatinine 0.95 11/29/2017 08:47 AM     Lab Results   Component Value Date/Time    Cholesterol, total 171 11/29/2017 08:47 AM    HDL Cholesterol 51 11/29/2017 08:47 AM    LDL, calculated 98.6 11/29/2017 08:47 AM    Triglyceride 107 11/29/2017 08:47 AM     No results found for: GPT    Previous labs    ASSESSMENT/PLAN:    1. Pain of left thumb: she also has wrist pain. Will give wrist splint. She will use heating pad, icy/hot, tiger balm for pain. She did not want to get x-ray. If not better, she will let us know. She can use ace wrap as well. 2. Patient verbalized understanding and agreement with the plan. 3. Patient was given an after-visit summary. 4. Follow-up Disposition:  Return if symptoms worsen or fail to improve. or sooner if worsening symptoms.           Yvonne Cruz M.D.

## 2019-01-22 NOTE — PROGRESS NOTES
Pharmacy Note: Immunization Update    Patient: Ramy Swartz (32 y. o., 1985)     Patient's immunization history was updated to reflect information contained in the [place] and/or outside immunization/pharmacy records were reconciled within [facility]. Health Maintenance schedule updated when appropriate.     Current immunizations now reflect:       Immunization History   Administered Date(s) Administered    Influenza Vaccine 10/23/2018    Tdap 07/24/2017       Sherry Hamilton, Cathi, BCACP

## 2019-01-22 NOTE — PATIENT INSTRUCTIONS
1) use heating pad, icy/hot, tiger balm for pain. 2) Wrap with ace. 3) Follow-up as needed or sooner if worsening symptoms.

## 2019-03-05 ENCOUNTER — TELEPHONE (OUTPATIENT)
Dept: INTERNAL MEDICINE CLINIC | Age: 34
End: 2019-03-05

## 2019-03-05 DIAGNOSIS — E03.9 HYPOTHYROIDISM, UNSPECIFIED TYPE: ICD-10-CM

## 2019-03-05 DIAGNOSIS — E11.9 DIABETES MELLITUS, STABLE (HCC): Primary | ICD-10-CM

## 2019-03-05 DIAGNOSIS — Z13.21 ENCOUNTER FOR VITAMIN DEFICIENCY SCREENING: ICD-10-CM

## 2019-03-05 DIAGNOSIS — E78.5 DYSLIPIDEMIA: ICD-10-CM

## 2019-03-07 ENCOUNTER — HOSPITAL ENCOUNTER (OUTPATIENT)
Dept: LAB | Age: 34
Discharge: HOME | End: 2019-03-07
Payer: OTHER GOVERNMENT

## 2019-03-07 ENCOUNTER — OFFICE VISIT (OUTPATIENT)
Dept: INTERNAL MEDICINE CLINIC | Age: 34
End: 2019-03-07

## 2019-03-07 VITALS
RESPIRATION RATE: 16 BRPM | WEIGHT: 239 LBS | TEMPERATURE: 96.9 F | OXYGEN SATURATION: 99 % | HEART RATE: 90 BPM | SYSTOLIC BLOOD PRESSURE: 124 MMHG | HEIGHT: 66 IN | BODY MASS INDEX: 38.41 KG/M2 | DIASTOLIC BLOOD PRESSURE: 75 MMHG

## 2019-03-07 DIAGNOSIS — E11.9 DIABETES MELLITUS, STABLE (HCC): ICD-10-CM

## 2019-03-07 DIAGNOSIS — E78.5 DYSLIPIDEMIA: ICD-10-CM

## 2019-03-07 DIAGNOSIS — E03.9 HYPOTHYROIDISM, UNSPECIFIED TYPE: ICD-10-CM

## 2019-03-07 DIAGNOSIS — Z13.21 ENCOUNTER FOR VITAMIN DEFICIENCY SCREENING: ICD-10-CM

## 2019-03-07 LAB
ALBUMIN SERPL-MCNC: 4.1 G/DL (ref 3.4–5)
ALBUMIN/GLOB SERPL: 1.2 {RATIO} (ref 0.8–1.7)
ALP SERPL-CCNC: 123 U/L (ref 45–117)
ALT SERPL-CCNC: 30 U/L (ref 13–56)
ANION GAP SERPL CALC-SCNC: 7 MMOL/L (ref 3–18)
APPEARANCE UR: CLEAR
AST SERPL-CCNC: 18 U/L (ref 15–37)
BACTERIA URNS QL MICRO: NEGATIVE /HPF
BILIRUB SERPL-MCNC: 0.2 MG/DL (ref 0.2–1)
BILIRUB UR QL: NEGATIVE
BUN SERPL-MCNC: 16 MG/DL (ref 7–18)
BUN/CREAT SERPL: 16 (ref 12–20)
CALCIUM SERPL-MCNC: 8.7 MG/DL (ref 8.5–10.1)
CHLORIDE SERPL-SCNC: 106 MMOL/L (ref 100–108)
CHOLEST SERPL-MCNC: 183 MG/DL
CO2 SERPL-SCNC: 25 MMOL/L (ref 21–32)
COLOR UR: YELLOW
CREAT SERPL-MCNC: 0.98 MG/DL (ref 0.6–1.3)
EPITH CASTS URNS QL MICRO: NORMAL /LPF (ref 0–5)
EST. AVERAGE GLUCOSE BLD GHB EST-MCNC: 128 MG/DL
GLOBULIN SER CALC-MCNC: 3.3 G/DL (ref 2–4)
GLUCOSE SERPL-MCNC: 104 MG/DL (ref 74–99)
GLUCOSE UR STRIP.AUTO-MCNC: NEGATIVE MG/DL
HBA1C MFR BLD: 6.1 % (ref 4.2–5.6)
HDLC SERPL-MCNC: 60 MG/DL (ref 40–60)
HDLC SERPL: 3.1 {RATIO} (ref 0–5)
HGB UR QL STRIP: NEGATIVE
KETONES UR QL STRIP.AUTO: ABNORMAL MG/DL
LDLC SERPL CALC-MCNC: 78 MG/DL (ref 0–100)
LEUKOCYTE ESTERASE UR QL STRIP.AUTO: ABNORMAL
LIPID PROFILE,FLP: ABNORMAL
NITRITE UR QL STRIP.AUTO: NEGATIVE
PH UR STRIP: 5 [PH] (ref 5–8)
POTASSIUM SERPL-SCNC: 3.5 MMOL/L (ref 3.5–5.5)
PROT SERPL-MCNC: 7.4 G/DL (ref 6.4–8.2)
PROT UR STRIP-MCNC: NEGATIVE MG/DL
RBC #/AREA URNS HPF: 0 /HPF (ref 0–5)
SODIUM SERPL-SCNC: 138 MMOL/L (ref 136–145)
SP GR UR REFRACTOMETRY: 1.03 (ref 1–1.03)
T4 FREE SERPL-MCNC: 0.7 NG/DL (ref 0.7–1.5)
TRIGL SERPL-MCNC: 225 MG/DL (ref ?–150)
TSH SERPL DL<=0.05 MIU/L-ACNC: 1.9 UIU/ML (ref 0.36–3.74)
UROBILINOGEN UR QL STRIP.AUTO: 0.2 EU/DL (ref 0.2–1)
VLDLC SERPL CALC-MCNC: 45 MG/DL
WBC URNS QL MICRO: NORMAL /HPF (ref 0–4)

## 2019-03-07 PROCEDURE — 81001 URINALYSIS AUTO W/SCOPE: CPT

## 2019-03-07 PROCEDURE — 82306 VITAMIN D 25 HYDROXY: CPT

## 2019-03-07 PROCEDURE — 83036 HEMOGLOBIN GLYCOSYLATED A1C: CPT

## 2019-03-07 PROCEDURE — 82043 UR ALBUMIN QUANTITATIVE: CPT

## 2019-03-07 PROCEDURE — 84439 ASSAY OF FREE THYROXINE: CPT

## 2019-03-07 PROCEDURE — 36415 COLL VENOUS BLD VENIPUNCTURE: CPT

## 2019-03-07 PROCEDURE — 80061 LIPID PANEL: CPT

## 2019-03-07 PROCEDURE — 80053 COMPREHEN METABOLIC PANEL: CPT

## 2019-03-07 NOTE — PROGRESS NOTES
ROOM # 2    Opal Collins presents today for   Chief Complaint   Patient presents with    Cholesterol Problem     f/u       Alley Rodrigez preferred language for health care discussion is english/other. Is someone accompanying this pt? no    Is the patient using any DME equipment during [address]? no    Depression Screening:  3 most recent [facility] Screens 11/29/2017 10/17/2017 7/24/2017 8/15/2016 6/30/2016 6/21/2016   Little interest or pleasure in doing things Not at all Not at all Not at all Not at all Not at all Not at all   Feeling down, depressed, irritable, or hopeless Not at all Not at all Not at all Not at all Not at all Not at all   Total Score PHQ 2 0 0 0 0 0 0       Learning Assessment:  Learning Assessment 6/21/2016   PRIMARY LEARNER Patient   PRIMARY LANGUAGE ENGLISH   LEARNER PREFERENCE PRIMARY DEMONSTRATION     LISTENING     READING   ANSWERED BY patient   RELATIONSHIP SELF       Abuse Screening:  Abuse Screening Questionnaire 10/17/2017   Do you ever feel afraid of your partner? N   Are you in a relationship with someone who physically or mentally threatens you? N   Is it safe for you to go home? Y       Fall Risk  No flowsheet data found. Health Maintenance reviewed and discussed per provider. Yes    Alley Rodrigez is due for   Health Maintenance Due   Topic Date Due    HEMOGLOBIN A1C Q6M  05/29/2018    FOOT EXAM Q1  07/24/2018    MICROALBUMIN Q1  07/26/2018    LIPID PANEL Q1  11/29/2018   HM to be d/w provider      Please order/place referral if appropriate. Advance Directive:  1. Do you have an advance directive in place? Patient Reply: no    2. If not, would you like material regarding how to put one in place? Patient Reply: no    Coordination of Care:  1. Have you been to the ER, urgent care clinic since your last visit? Hospitalized since your last visit? no    2.  Have you seen or consulted any other health care providers outside of the [address] [place] [address] since your last visit? Include any pap smears or colon screening.  no

## 2019-03-08 LAB
25(OH)D3 SERPL-MCNC: 38.8 NG/ML (ref 30–100)
CREAT UR-MCNC: 180 MG/DL (ref 30–125)
MICROALBUMIN UR-MCNC: 0.72 MG/DL (ref 0–3)
MICROALBUMIN/CREAT UR-RTO: 4 MG/G (ref 0–30)

## 2019-03-09 NOTE — PROGRESS NOTES
Please let pt know that labs were normal except:    1) urine creatinine up. We will monitor. It is better than 1 yr ago. 2) HgA1C is good at 6.1. Glucose up at 104. Watch carbs and sweets. 3) Urine shows WBC's, but no bacteria. Is she having any f/c/ns, dysuria, hematuria, increase urgency/frequency, abd pain, back pain? 4) alk phos up at 123. Is she having any RUQ pain, yellowing of eyes or skin, n/v?    5) Trig up at 225 and needs to be 150 or less. Work on diet and exercise. Start taking Fish oil 1000mg (DHA+EPA=1000mg) 2 po daily. 6) She needs to f/u with me in 4 months.

## 2019-03-11 NOTE — PROGRESS NOTES
2 pt. Identifiers confirmed. Pt. Notified of above. She statess she is not currently having any UTI s/s or RUQ pain/associated s/s though she did have the RUQ pain 3 wks ago for a couple day (now resolved). No other questions/concerns at this time.

## 2019-04-15 ENCOUNTER — TELEPHONE (OUTPATIENT)
Dept: INTERNAL MEDICINE CLINIC | Age: 34
End: 2019-04-15

## 2019-04-15 DIAGNOSIS — M25.532 LEFT WRIST PAIN: Primary | ICD-10-CM

## 2019-04-15 DIAGNOSIS — M79.645 CHRONIC PAIN OF LEFT THUMB: ICD-10-CM

## 2019-04-15 DIAGNOSIS — G89.29 CHRONIC PAIN OF LEFT THUMB: ICD-10-CM

## 2019-04-16 ENCOUNTER — TELEPHONE (OUTPATIENT)
Dept: INTERNAL MEDICINE CLINIC | Age: 34
End: 2019-04-16

## 2019-04-16 DIAGNOSIS — M25.532 LEFT WRIST PAIN: Primary | ICD-10-CM

## 2019-04-16 NOTE — TELEPHONE ENCOUNTER
Keli Miller from central scheduling called stating an MRI of the left wrist with contrast was ordered. Keli Miller stated, since it's a joint, an arthrogram is needed. If not, a new order without contrast needs to be placed. Keli Miller is requesting notification once the order is placed.

## 2019-04-25 ENCOUNTER — HOSPITAL ENCOUNTER (OUTPATIENT)
Dept: MRI IMAGING | Age: 34
Discharge: HOME | End: 2019-04-25
Attending: INTERNAL MEDICINE
Payer: OTHER GOVERNMENT

## 2019-04-25 DIAGNOSIS — G89.29 CHRONIC PAIN OF LEFT THUMB: ICD-10-CM

## 2019-04-25 DIAGNOSIS — M25.532 LEFT WRIST PAIN: ICD-10-CM

## 2019-04-25 DIAGNOSIS — M79.645 CHRONIC PAIN OF LEFT THUMB: ICD-10-CM

## 2019-04-25 PROCEDURE — 73221 MRI JOINT UPR EXTREM W/O DYE: CPT

## 2019-04-25 PROCEDURE — 73218 MRI UPPER EXTREMITY W/O DYE: CPT

## 2019-04-30 DIAGNOSIS — M79.642 LEFT HAND PAIN: ICD-10-CM

## 2019-04-30 DIAGNOSIS — M25.532 LEFT WRIST PAIN: Primary | ICD-10-CM

## 2019-04-30 NOTE — PROGRESS NOTES
Please let pt know that MRI of the wrist showed:    1) inflammation of the tendon that affects movement of the thumb. 2) There is also a tear is this location. 3) Will refer to hand surgeon.     4) several ganglion cysts at the wrist

## 2019-05-02 NOTE — PROGRESS NOTES
Spoke with patient and 2 patient identifiers was confirmed. Patient was given results below and verbalized understanding . Patient has no questions/concerns  at this time.  Patient was give phone number to Dr. Felecia Walter office to f/u on the referral.

## 2019-08-31 ENCOUNTER — OFFICE VISIT (OUTPATIENT)
Dept: INTERNAL MEDICINE CLINIC | Age: 34
End: 2019-08-31

## 2019-08-31 VITALS
WEIGHT: 238 LBS | HEART RATE: 76 BPM | OXYGEN SATURATION: 96 % | HEIGHT: 66 IN | RESPIRATION RATE: 16 BRPM | BODY MASS INDEX: 38.25 KG/M2 | DIASTOLIC BLOOD PRESSURE: 72 MMHG | TEMPERATURE: 98.4 F | SYSTOLIC BLOOD PRESSURE: 105 MMHG

## 2019-08-31 DIAGNOSIS — E78.5 DYSLIPIDEMIA: ICD-10-CM

## 2019-08-31 DIAGNOSIS — E11.9 DIABETES MELLITUS, STABLE (HCC): ICD-10-CM

## 2019-08-31 DIAGNOSIS — E03.9 HYPOTHYROIDISM, UNSPECIFIED TYPE: ICD-10-CM

## 2019-08-31 DIAGNOSIS — E55.9 VITAMIN D DEFICIENCY: ICD-10-CM

## 2019-08-31 DIAGNOSIS — Z00.00 ROUTINE GENERAL MEDICAL EXAMINATION AT A HEALTH CARE FACILITY: Primary | ICD-10-CM

## 2019-08-31 NOTE — PROGRESS NOTES
ROOM # 1    Opal Pastrana presents today for   Chief Complaint   Patient presents with    Physical       [address] preferred language for health care discussion is english/other. Is someone accompanying this pt? No    Is the patient using any DME equipment during OV? No    Depression Screening:  3 most recent [facility] [address] Screens 8/31/2019 11/29/2017 10/17/2017 7/24/2017 8/15/2016 6/30/2016 6/21/2016   Little interest or pleasure in doing things Not at all Not at all Not at all Not at all Not at all Not at all Not at all   Feeling down, depressed, irritable, or hopeless Not at all Not at all Not at all Not at all Not at all Not at all Not at all   Total Score PHQ 2 0 0 0 0 0 0 0       Learning Assessment:  Learning Assessment 6/21/2016   PRIMARY LEARNER Patient   PRIMARY LANGUAGE ENGLISH   LEARNER PREFERENCE PRIMARY DEMONSTRATION     LISTENING     READING   ANSWERED BY patient   RELATIONSHIP SELF       Abuse Screening:  Abuse Screening Questionnaire 10/17/2017   Do you ever feel afraid of your partner? N   Are you in a relationship with someone who physically or mentally threatens you? N   Is it safe for you to go home? Y       Fall Risk  No flowsheet data found. Health Maintenance reviewed and discussed per provider. Yes    [address] is due for   Health Maintenance Due   Topic Date Due    Pneumococcal 0-64 years (1 of 1 - PPSV23) 11/10/1989    FOOT EXAM Q1  07/24/2018    Influenza Age 9 to Adult  08/01/2019    HEMOGLOBIN A1C Q6M  09/07/2019     Please order/place referral if appropriate. Advance Directive:  1. Do you have an advance directive in place? Patient Reply: No    2. If not, would you like material regarding how to put one in place? Patient Reply: No    Coordination of Care:  1. Have you been to the ER, urgent care clinic since your last visit? Hospitalized since your last visit? No    2.  Have you seen or consulted any other health care providers outside of the [facility] since your last visit? Include any pap smears or colon screening.  [facility]

## 2019-08-31 NOTE — LETTER
2019 11:53 AM 
 
Ms. Dong Riddle [address] [phone] To Whom It May Concern: This is a letter to state that Mrs. Brenda Collins, : 1985, was seen for a complete physical examination on 19. If you have any questions, please have the patient contact us at [phone]. Sincerely, Carolyn Jiménez M.D.

## 2019-08-31 NOTE — PROGRESS NOTES
Chief Complaint   Patient presents with    Physical         HPI:     Goldie Yanez is a 26 y.o.  female with history of type 2 DM and dyslipidemia   who presents for complete physical exam.    She has orders to go to [facility] and needs CPE for the [employer]. She denies any chest pain, shortness of breath, abdominal pain, headaches or dizziness. She has not been checking her sugars. Past Medical History:   Diagnosis Date    Diabetes mellitus, stable ([address] 2016    Dyslipidemia 2016    Fatty liver 2016    Vitamin D deficiency 2016       Past Surgical History:   Procedure Laterality Date    HX  SECTION  2018    HX WISDOM TEETH EXTRACTION  at age 13 or 11yo    all 4           MEDICATION ALLERGIES/INTOLERANCES:   No Known Allergies          CURRENT MEDICATIONS:    Current Outpatient Medications   Medication Sig    cholecalciferol, VITAMIN D3, (VITAMIN D3) 5,000 unit tab tablet Take 5,000 Units by mouth daily.  DOCOSAHEXANOIC ACID (PRENATAL DHA PO) Take  by mouth.  glucose blood VI test strips (ASCENSIA AUTODISC VI, ONE TOUCH ULTRA TEST VI) strip Check fasting sugars twice a day before breakfast and dinner. One touch meter    Blood-Glucose Meter monitoring kit Check fasting sugars twice a day before breakfast and dinner. One touch meter    Lancets misc Check fasting sugars twice a day before breakfast and dinner. One touch meter     No current facility-administered medications for this visit.         Health Maintenance   Topic Date Due    Pneumococcal 0-64 years (1 of 1 - PPSV23) 11/10/1989    FOOT EXAM Q1  2018    Influenza Age 5 to Adult  2019    HEMOGLOBIN A1C Q6M  2019    EYE EXAM RETINAL OR DILATED  10/09/2019    MICROALBUMIN Q1  2020    LIPID PANEL Q1  2020    PAP AKA CERVICAL CYTOLOGY  10/17/2021    DTaP/Tdap/Td series (2 - Td) 2027         FAMILY HISTORY:   Family History   Problem Relation Age of Onset    Diabetes Father     Heart Disease Maternal Aunt         MI in her 52's    Heart Disease Mother     Elevated Lipids Mother     Thyroid Disease Mother        SOCIAL HISTORY:   She  reports that she has never smoked. She has never used smokeless tobacco.  She  reports that she drinks alcohol.           ROS:   General: negative for - chills, fatigue, fever, weight loss or weight gain, night sweats  HEENT: negative for - no sore throat, nasal congestion, vision problems or ear problems  Resp: negative for - cough, shortness of breath or wheezing  CV: negative for - chest pain, palpitations, orthopnea or PND,  GI: negative for - abdominal pain, change in bowel habits, constipation, diarrhea, blood or black tarry stools, or nausea/vomiting  : negative for - dysuria, hematuria, incontinence, pelvic pain or vulvar/vaginal symptoms  Heme: negative for -excessive bleeding or bruising  Endo: negative for - hot flashes, polydipsia/polyuria or hot or cold intolerance  MSK: negative for - joint pain, joint swelling or muscle pain  Neuro: negative for - numbness, tingling, headache or dizziness  Derm: negative for - dry skin, hair changes, rash or skin lesion changes  Psych: negative for - anxiety, depression, irritability or mood swings or insomnia    OBJECTIVE:  PHYSICAL EXAM: Vitals:   Vitals:    08/31/19 1135   BP: 105/72   Pulse: 76   Resp: 16   Temp: 98.4 °F (36.9 °C)   TempSrc: Oral   SpO2: 96%   Weight: 238 lb (108 kg)   Height: 5' 6\" (1.676 m)     Generally: Pleasant female in no acute distress    HEENT exam: Head: atraumatic     Eyes: Pupils equally round and reactive to light, Fundoscopic exam is                       normal               Ears: bilaterally: Normal tympanic membrane, no erythema or exudate,                         normal light Reflex    Nares: moist mucosa, no erythema               Mouth: clear, no erythema or exudate     Neck: supple, no lymphadenopathy, negative thyromegaly, negative carotid bruits bilaterally    Cardiac exam: regular, rate, and rhythm. No murmurs, gallops, or rubs. Normal S1 and S2.    Pulmonary exam: Clear to ausculation bilaterally    Abdominal exam: Positive bowel sounds in all four quadrants, soft, nondistended, nontender. No hepatosplenomegaly. Extremities: 2+ dorsalis pedis bilaterally. No pedal edema bilaterally. Musculoskeletal exam: 5 out of 5 strength in upper and lower extremities bilaterally. Good range of motion in upper and lower extremities. 2 out of 2 reflexes in upper and lower extremities bilaterally. Neurological exam: Cranial nerves II-XII all intact. Normal sensation in upper and lower extremities. Normal gait. LABS/RADIOLOGICAL TESTS:   Component      Latest Ref Rng & Units 3/7/2019 3/7/2019 3/7/2019 3/7/2019           4:35 PM  4:35 PM  4:35 PM  4:35 PM   Sodium      136 - 145 mmol/L       Potassium      3.5 - 5.5 mmol/L       Chloride      100 - 108 mmol/L       CO2      21 - 32 mmol/L       Anion gap      3.0 - 18 mmol/L       Glucose      74 - 99 mg/dL       BUN      7.0 - 18 MG/DL       Creatinine      0.6 - 1.3 MG/DL       BUN/Creatinine ratio      12 - 20         GFR est AA      >60 ml/min/1.73m2       GFR est non-AA      >60 ml/min/1.73m2       Calcium      8.5 - 10.1 MG/DL       Bilirubin, total      0.2 - 1.0 MG/DL       ALT (SGPT)      13 - 56 U/L       AST      15 - 37 U/L       Alk.  phosphatase      45 - 117 U/L       Protein, total      6.4 - 8.2 g/dL       Albumin      3.4 - 5.0 g/dL       Globulin      2.0 - 4.0 g/dL       A-G Ratio      0.8 - 1.7         Color             Appearance             Specific gravity      1.005 - 1.030         pH (UA)      5.0 - 8.0         Protein      NEG mg/dL       Glucose      NEG mg/dL       Ketone      NEG mg/dL       Bilirubin      NEG         Blood      NEG         Urobilinogen      0.2 - 1.0 EU/dL       Nitrites      NEG         Leukocyte Esterase NEG         Cholesterol, total      <200 MG/DL       Triglyceride      <150 MG/DL       HDL Cholesterol      40 - 60 MG/DL       LDL, calculated      0 - 100 MG/DL       VLDL, calculated      MG/DL       CHOL/HDL Ratio      0 - 5.0         WBC      0 - 4 /hpf       RBC      0 - 5 /hpf       Epithelial cells      0 - 5 /lpf       Bacteria      NEG /hpf       Microalbumin,urine random      0 - 3.0 MG/DL 0.72      Creatinine, urine      30 - 125 mg/dL 180.00 (H)      Microalbumin/Creat. Ratio      0 - 30 mg/g 4      Hemoglobin A1c, (calculated)      4.2 - 5.6 %   6.1 (H)    Est. average glucose      mg/dL   128    TSH      0.36 - 3.74 uIU/mL    1.90   T4, Free      0.7 - 1.5 NG/DL    0.7   Vitamin D 25-Hydroxy      30 - 100 ng/mL  38.8       Component      Latest Ref Rng & Units 3/7/2019 3/7/2019 3/7/2019 3/7/2019           4:35 PM  4:35 PM  4:35 PM  4:35 PM   Sodium      136 - 145 mmol/L  138     Potassium      3.5 - 5.5 mmol/L  3.5     Chloride      100 - 108 mmol/L  106     CO2      21 - 32 mmol/L  25     Anion gap      3.0 - 18 mmol/L  7     Glucose      74 - 99 mg/dL  104 (H)     BUN      7.0 - 18 MG/DL  16     Creatinine      0.6 - 1.3 MG/DL  0.98     BUN/Creatinine ratio      12 - 20    16     GFR est AA      >60 ml/min/1.73m2  >60     GFR est non-AA      >60 ml/min/1.73m2  >60     Calcium      8.5 - 10.1 MG/DL  8.7     Bilirubin, total      0.2 - 1.0 MG/DL  0.2     ALT (SGPT)      13 - 56 U/L  30     AST      15 - 37 U/L  18     Alk.  phosphatase      45 - 117 U/L  123 (H)     Protein, total      6.4 - 8.2 g/dL  7.4     Albumin      3.4 - 5.0 g/dL  4.1     Globulin      2.0 - 4.0 g/dL  3.3     A-G Ratio      0.8 - 1.7    1.2     Color          YELLOW   Appearance          CLEAR   Specific gravity      1.005 - 1.030      1.026   pH (UA)      5.0 - 8.0      5.0   Protein      NEG mg/dL    NEGATIVE   Glucose      NEG mg/dL    NEGATIVE   Ketone      NEG mg/dL    TRACE (A)   Bilirubin      NEG      NEGATIVE Blood      NEG      NEGATIVE   Urobilinogen      0.2 - 1.0 EU/dL    0.2   Nitrites      NEG      NEGATIVE   Leukocyte Esterase      NEG      SMALL (A)   Cholesterol, total      <200 MG/      Triglyceride      <150 MG/ (H)      HDL Cholesterol      40 - 60 MG/DL 60      LDL, calculated      0 - 100 MG/DL 78      VLDL, calculated      MG/DL 45      CHOL/HDL Ratio      0 - 5.0   3.1      WBC      0 - 4 /hpf   3 to 6    RBC      0 - 5 /hpf   0    Epithelial cells      0 - 5 /lpf   FEW    Bacteria      NEG /hpf   NEGATIVE    Microalbumin,urine random      0 - 3.0 MG/DL       Creatinine, urine      30 - 125 mg/dL       Microalbumin/Creat. Ratio      0 - 30 mg/g       Hemoglobin A1c, (calculated)      4.2 - 5.6 %       Est. average glucose      mg/dL       TSH      0.36 - 3.74 uIU/mL       T4, Free      0.7 - 1.5 NG/DL       Vitamin D 25-Hydroxy      30 - 100 ng/mL         Previous labs      ASSESSMENT/PLAN:      ICD-10-CM ICD-9-CM    1. Routine general medical examination at a health care facility Z00.00 V70.0    2. Diabetes mellitus, stable ([facility] [address] E11.9 250.00 We will see what labs show. Continue diet and exercise. HEMOGLOBIN A1C WITH EAG   3. Dyslipidemia E78.5 272.4 We will see what the labs show. Continue diet, exercise and fish oil. METABOLIC PANEL, COMPREHENSIVE      LIPID PANEL   4. Hypothyroidism, unspecified type E03.9 244.9   Seems stable off medications. Will check levels. TSH AND FREE T4   5. Vitamin D deficiency E55.9 268.9 Stable. Continue vitamin D.   VITAMIN D, 25 HYDROXY     6. Patient verbalized understanding and agreement with the plan. 7. Patient was given after visit summary. 8.   Follow-up and Dispositions    Return in about 4 months (around 1/6/2020) for f/u DM  or sooner if worsening symptoms.   ·            Nelly Tay MD

## 2019-10-28 ENCOUNTER — HOSPITAL ENCOUNTER (OUTPATIENT)
Dept: LAB | Age: 34
Discharge: HOME | End: 2019-10-28
Payer: OTHER GOVERNMENT

## 2019-10-28 DIAGNOSIS — E03.9 HYPOTHYROIDISM, UNSPECIFIED TYPE: ICD-10-CM

## 2019-10-28 DIAGNOSIS — E55.9 VITAMIN D DEFICIENCY: ICD-10-CM

## 2019-10-28 DIAGNOSIS — E78.5 DYSLIPIDEMIA: ICD-10-CM

## 2019-10-28 DIAGNOSIS — E11.9 DIABETES MELLITUS, STABLE (HCC): ICD-10-CM

## 2019-10-28 LAB
ALBUMIN SERPL-MCNC: 4.3 G/DL (ref 3.4–5)
ALBUMIN/GLOB SERPL: 1.4 {RATIO} (ref 0.8–1.7)
ALP SERPL-CCNC: 104 U/L (ref 45–117)
ALT SERPL-CCNC: 24 U/L (ref 13–56)
ANION GAP SERPL CALC-SCNC: 8 MMOL/L (ref 3–18)
AST SERPL-CCNC: 14 U/L (ref 10–38)
BILIRUB SERPL-MCNC: 0.4 MG/DL (ref 0.2–1)
BUN SERPL-MCNC: 11 MG/DL (ref 7–18)
BUN/CREAT SERPL: 11 (ref 12–20)
CALCIUM SERPL-MCNC: 9.2 MG/DL (ref 8.5–10.1)
CHLORIDE SERPL-SCNC: 105 MMOL/L (ref 100–111)
CO2 SERPL-SCNC: 27 MMOL/L (ref 21–32)
CREAT SERPL-MCNC: 1.01 MG/DL (ref 0.6–1.3)
EST. AVERAGE GLUCOSE BLD GHB EST-MCNC: 123 MG/DL
GLOBULIN SER CALC-MCNC: 3.1 G/DL (ref 2–4)
GLUCOSE SERPL-MCNC: 78 MG/DL (ref 74–99)
HBA1C MFR BLD: 5.9 % (ref 4.2–5.6)
POTASSIUM SERPL-SCNC: 4.5 MMOL/L (ref 3.5–5.5)
PROT SERPL-MCNC: 7.4 G/DL (ref 6.4–8.2)
SODIUM SERPL-SCNC: 140 MMOL/L (ref 136–145)

## 2019-10-28 PROCEDURE — 83036 HEMOGLOBIN GLYCOSYLATED A1C: CPT

## 2019-10-28 PROCEDURE — 80061 LIPID PANEL: CPT

## 2019-10-28 PROCEDURE — 82306 VITAMIN D 25 HYDROXY: CPT

## 2019-10-28 PROCEDURE — 36415 COLL VENOUS BLD VENIPUNCTURE: CPT

## 2019-10-28 PROCEDURE — 80053 COMPREHEN METABOLIC PANEL: CPT

## 2019-10-28 PROCEDURE — 84439 ASSAY OF FREE THYROXINE: CPT

## 2019-10-29 LAB
25(OH)D3 SERPL-MCNC: 24.4 NG/ML (ref 30–100)
CHOLEST SERPL-MCNC: 203 MG/DL
HDLC SERPL-MCNC: 62 MG/DL (ref 40–60)
HDLC SERPL: 3.3 {RATIO} (ref 0–5)
LDLC SERPL CALC-MCNC: 120.4 MG/DL (ref 0–100)
LIPID PROFILE,FLP: ABNORMAL
T4 FREE SERPL-MCNC: 0.9 NG/DL (ref 0.7–1.5)
TRIGL SERPL-MCNC: 103 MG/DL (ref ?–150)
TSH SERPL DL<=0.05 MIU/L-ACNC: 1.74 UIU/ML (ref 0.36–3.74)
VLDLC SERPL CALC-MCNC: 20.6 MG/DL

## 2025-03-23 NOTE — PROGRESS NOTES
Result letter generated on 4/30/19 and mailed to pt.
Polytrim 1 drop each eye 3 times a day x 5 days